# Patient Record
Sex: FEMALE | Race: WHITE | Employment: OTHER | URBAN - METROPOLITAN AREA
[De-identification: names, ages, dates, MRNs, and addresses within clinical notes are randomized per-mention and may not be internally consistent; named-entity substitution may affect disease eponyms.]

---

## 2018-07-17 ENCOUNTER — HOSPITAL ENCOUNTER (OUTPATIENT)
Dept: LAB | Age: 83
Discharge: HOME | End: 2018-07-17
Payer: MEDICARE

## 2018-07-17 PROCEDURE — 88175 CYTOPATH C/V AUTO FLUID REDO: CPT | Performed by: OBSTETRICS & GYNECOLOGY

## 2020-01-01 ENCOUNTER — APPOINTMENT (OUTPATIENT)
Dept: NON INVASIVE DIAGNOSTICS | Age: 85
DRG: 871 | End: 2020-01-01
Attending: INTERNAL MEDICINE
Payer: MEDICARE

## 2020-01-01 ENCOUNTER — HOSPITAL ENCOUNTER (INPATIENT)
Age: 85
LOS: 3 days | Discharge: HOSPICE | DRG: 871 | End: 2020-05-18
Attending: EMERGENCY MEDICINE | Admitting: INTERNAL MEDICINE
Payer: MEDICARE

## 2020-01-01 ENCOUNTER — APPOINTMENT (OUTPATIENT)
Dept: GENERAL RADIOLOGY | Age: 85
DRG: 871 | End: 2020-01-01
Attending: EMERGENCY MEDICINE
Payer: MEDICARE

## 2020-01-01 ENCOUNTER — APPOINTMENT (OUTPATIENT)
Dept: CT IMAGING | Age: 85
DRG: 871 | End: 2020-01-01
Attending: EMERGENCY MEDICINE
Payer: MEDICARE

## 2020-01-01 ENCOUNTER — APPOINTMENT (OUTPATIENT)
Dept: GENERAL RADIOLOGY | Age: 85
DRG: 871 | End: 2020-01-01
Attending: INTERNAL MEDICINE
Payer: MEDICARE

## 2020-01-01 ENCOUNTER — HOSPITAL ENCOUNTER (INPATIENT)
Age: 85
LOS: 2 days | DRG: 291 | End: 2020-05-20
Attending: FAMILY MEDICINE | Admitting: FAMILY MEDICINE
Payer: OTHER MISCELLANEOUS

## 2020-01-01 ENCOUNTER — HOSPICE ADMISSION (OUTPATIENT)
Dept: HOSPICE | Facility: HOSPICE | Age: 85
End: 2020-01-01
Payer: MEDICARE

## 2020-01-01 VITALS
HEIGHT: 63 IN | RESPIRATION RATE: 18 BRPM | HEART RATE: 86 BPM | BODY MASS INDEX: 17.73 KG/M2 | TEMPERATURE: 96.5 F | WEIGHT: 100.09 LBS | SYSTOLIC BLOOD PRESSURE: 146 MMHG | DIASTOLIC BLOOD PRESSURE: 69 MMHG | OXYGEN SATURATION: 93 %

## 2020-01-01 VITALS
TEMPERATURE: 99.8 F | DIASTOLIC BLOOD PRESSURE: 64 MMHG | HEART RATE: 124 BPM | OXYGEN SATURATION: 93 % | SYSTOLIC BLOOD PRESSURE: 124 MMHG | RESPIRATION RATE: 16 BRPM

## 2020-01-01 DIAGNOSIS — J90 PLEURAL EFFUSION ON RIGHT: ICD-10-CM

## 2020-01-01 DIAGNOSIS — I48.20 CHRONIC ATRIAL FIBRILLATION WITH RAPID VENTRICULAR RESPONSE (HCC): ICD-10-CM

## 2020-01-01 DIAGNOSIS — F01.50 VASCULAR DEMENTIA WITHOUT BEHAVIORAL DISTURBANCE (HCC): ICD-10-CM

## 2020-01-01 DIAGNOSIS — R65.20 SEVERE SEPSIS (HCC): Primary | ICD-10-CM

## 2020-01-01 DIAGNOSIS — R06.02 SHORTNESS OF BREATH: ICD-10-CM

## 2020-01-01 DIAGNOSIS — R53.81 DEBILITY: ICD-10-CM

## 2020-01-01 DIAGNOSIS — Z71.89 GOALS OF CARE, COUNSELING/DISCUSSION: ICD-10-CM

## 2020-01-01 DIAGNOSIS — Z51.5 HOSPICE CARE PATIENT: ICD-10-CM

## 2020-01-01 DIAGNOSIS — K56.41 FECAL IMPACTION (HCC): ICD-10-CM

## 2020-01-01 DIAGNOSIS — A41.9 SEVERE SEPSIS (HCC): Primary | ICD-10-CM

## 2020-01-01 DIAGNOSIS — I50.84 END STAGE HEART FAILURE (HCC): ICD-10-CM

## 2020-01-01 DIAGNOSIS — R41.0 CONFUSION: ICD-10-CM

## 2020-01-01 LAB
ALBUMIN SERPL-MCNC: 2.1 G/DL (ref 3.5–5)
ALBUMIN SERPL-MCNC: 3 G/DL (ref 3.5–5)
ALBUMIN/GLOB SERPL: 0.7 {RATIO} (ref 1.1–2.2)
ALBUMIN/GLOB SERPL: 0.8 {RATIO} (ref 1.1–2.2)
ALP SERPL-CCNC: 58 U/L (ref 45–117)
ALP SERPL-CCNC: 78 U/L (ref 45–117)
ALT SERPL-CCNC: 22 U/L (ref 12–78)
ALT SERPL-CCNC: 25 U/L (ref 12–78)
ANION GAP SERPL CALC-SCNC: 3 MMOL/L (ref 5–15)
ANION GAP SERPL CALC-SCNC: 4 MMOL/L (ref 5–15)
ANION GAP SERPL CALC-SCNC: 4 MMOL/L (ref 5–15)
ANION GAP SERPL CALC-SCNC: 6 MMOL/L (ref 5–15)
APPEARANCE UR: ABNORMAL
AST SERPL-CCNC: 20 U/L (ref 15–37)
AST SERPL-CCNC: 23 U/L (ref 15–37)
ATRIAL RATE: 150 BPM
AV VELOCITY RATIO: 0.86
B PERT DNA SPEC QL NAA+PROBE: NOT DETECTED
BACTERIA SPEC CULT: ABNORMAL
BACTERIA SPEC CULT: ABNORMAL
BACTERIA SPEC CULT: NORMAL
BACTERIA URNS QL MICRO: ABNORMAL /HPF
BASOPHILS # BLD: 0 K/UL (ref 0–0.1)
BASOPHILS NFR BLD: 0 % (ref 0–1)
BILIRUB SERPL-MCNC: 0.8 MG/DL (ref 0.2–1)
BILIRUB SERPL-MCNC: 1.2 MG/DL (ref 0.2–1)
BILIRUB UR QL: NEGATIVE
BNP SERPL-MCNC: 1438 PG/ML
BORDETELLA PARAPERTUSSIS PCR, BORPAR: NOT DETECTED
BUN SERPL-MCNC: 15 MG/DL (ref 6–20)
BUN SERPL-MCNC: 18 MG/DL (ref 6–20)
BUN SERPL-MCNC: 26 MG/DL (ref 6–20)
BUN SERPL-MCNC: 27 MG/DL (ref 6–20)
BUN/CREAT SERPL: 35 (ref 12–20)
BUN/CREAT SERPL: 35 (ref 12–20)
BUN/CREAT SERPL: 36 (ref 12–20)
BUN/CREAT SERPL: 38 (ref 12–20)
C PNEUM DNA SPEC QL NAA+PROBE: NOT DETECTED
CALCIUM SERPL-MCNC: 8 MG/DL (ref 8.5–10.1)
CALCIUM SERPL-MCNC: 8.5 MG/DL (ref 8.5–10.1)
CALCIUM SERPL-MCNC: 8.8 MG/DL (ref 8.5–10.1)
CALCIUM SERPL-MCNC: 9.4 MG/DL (ref 8.5–10.1)
CALCULATED R AXIS, ECG10: -124 DEGREES
CC UR VC: ABNORMAL
CHLORIDE SERPL-SCNC: 109 MMOL/L (ref 97–108)
CHLORIDE SERPL-SCNC: 109 MMOL/L (ref 97–108)
CHLORIDE SERPL-SCNC: 112 MMOL/L (ref 97–108)
CHLORIDE SERPL-SCNC: 115 MMOL/L (ref 97–108)
CK SERPL-CCNC: 25 U/L (ref 26–192)
CO2 SERPL-SCNC: 24 MMOL/L (ref 21–32)
CO2 SERPL-SCNC: 25 MMOL/L (ref 21–32)
CO2 SERPL-SCNC: 26 MMOL/L (ref 21–32)
CO2 SERPL-SCNC: 27 MMOL/L (ref 21–32)
COLOR UR: ABNORMAL
COMMENT, HOLDF: NORMAL
CREAT SERPL-MCNC: 0.42 MG/DL (ref 0.55–1.02)
CREAT SERPL-MCNC: 0.52 MG/DL (ref 0.55–1.02)
CREAT SERPL-MCNC: 0.68 MG/DL (ref 0.55–1.02)
CREAT SERPL-MCNC: 0.78 MG/DL (ref 0.55–1.02)
D DIMER PPP FEU-MCNC: 1.43 MG/L FEU (ref 0–0.65)
DATE LAST DOSE: ABNORMAL
DIAGNOSIS, 93000: NORMAL
DIFFERENTIAL METHOD BLD: ABNORMAL
ECHO AO ROOT DIAM: 3.64 CM
ECHO AV AREA PEAK VELOCITY: 2.6 CM2
ECHO AV AREA/BSA PEAK VELOCITY: 1.8 CM2/M2
ECHO AV PEAK GRADIENT: 5.4 MMHG
ECHO AV PEAK VELOCITY: 115.72 CM/S
ECHO EST RA PRESSURE: 8 MMHG
ECHO LA AREA 4C: 13.2 CM2
ECHO LA VOL 2C: 47.86 ML (ref 22–52)
ECHO LA VOL 4C: 30.92 ML (ref 22–52)
ECHO LA VOL BP: 39.9 ML (ref 22–52)
ECHO LA VOL/BSA BIPLANE: 27.69 ML/M2 (ref 16–28)
ECHO LA VOLUME INDEX A2C: 33.21 ML/M2 (ref 16–28)
ECHO LA VOLUME INDEX A4C: 21.46 ML/M2 (ref 16–28)
ECHO LV EDV TEICHHOLZ: 54.55 ML
ECHO LV ESV TEICHHOLZ: 17.11 ML
ECHO LV INTERNAL DIMENSION DIASTOLIC: 4.17 CM (ref 3.9–5.3)
ECHO LV INTERNAL DIMENSION SYSTOLIC: 2.59 CM
ECHO LV IVSD: 0.95 CM (ref 0.6–0.9)
ECHO LV MASS 2D: 168.6 G (ref 67–162)
ECHO LV MASS INDEX 2D: 117 G/M2 (ref 43–95)
ECHO LV POSTERIOR WALL DIASTOLIC: 1.16 CM (ref 0.6–0.9)
ECHO LVOT DIAM: 1.96 CM
ECHO LVOT PEAK GRADIENT: 4 MMHG
ECHO LVOT PEAK VELOCITY: 99.39 CM/S
ECHO PULMONARY ARTERY SYSTOLIC PRESSURE (PASP): 65.7 MMHG
ECHO PV MAX VELOCITY: 77.84 CM/S
ECHO PV PEAK GRADIENT: 2.4 MMHG
ECHO RIGHT VENTRICULAR SYSTOLIC PRESSURE (RVSP): 65.7 MMHG
ECHO RV INTERNAL DIMENSION: 4.43 CM
ECHO TV REGURGITANT MAX VELOCITY: 379.97 CM/S
ECHO TV REGURGITANT PEAK GRADIENT: 57.7 MMHG
EOSINOPHIL # BLD: 0 K/UL (ref 0–0.4)
EOSINOPHIL NFR BLD: 0 % (ref 0–7)
EPITH CASTS URNS QL MICRO: ABNORMAL /LPF
ERYTHROCYTE [DISTWIDTH] IN BLOOD BY AUTOMATED COUNT: 13.2 % (ref 11.5–14.5)
ERYTHROCYTE [DISTWIDTH] IN BLOOD BY AUTOMATED COUNT: 13.3 % (ref 11.5–14.5)
ERYTHROCYTE [DISTWIDTH] IN BLOOD BY AUTOMATED COUNT: 13.4 % (ref 11.5–14.5)
ERYTHROCYTE [DISTWIDTH] IN BLOOD BY AUTOMATED COUNT: 13.5 % (ref 11.5–14.5)
FERRITIN SERPL-MCNC: 306 NG/ML (ref 8–252)
FLUAV H1 2009 PAND RNA SPEC QL NAA+PROBE: NOT DETECTED
FLUAV H1 RNA SPEC QL NAA+PROBE: NOT DETECTED
FLUAV H3 RNA SPEC QL NAA+PROBE: NOT DETECTED
FLUAV SUBTYP SPEC NAA+PROBE: NOT DETECTED
FLUBV RNA SPEC QL NAA+PROBE: NOT DETECTED
FLUID CULTURE, SPNG2: NORMAL
GLOBULIN SER CALC-MCNC: 3.2 G/DL (ref 2–4)
GLOBULIN SER CALC-MCNC: 4 G/DL (ref 2–4)
GLUCOSE SERPL-MCNC: 142 MG/DL (ref 65–100)
GLUCOSE SERPL-MCNC: 144 MG/DL (ref 65–100)
GLUCOSE SERPL-MCNC: 180 MG/DL (ref 65–100)
GLUCOSE SERPL-MCNC: 200 MG/DL (ref 65–100)
GLUCOSE UR STRIP.AUTO-MCNC: NEGATIVE MG/DL
HADV DNA SPEC QL NAA+PROBE: NOT DETECTED
HCOV 229E RNA SPEC QL NAA+PROBE: NOT DETECTED
HCOV HKU1 RNA SPEC QL NAA+PROBE: NOT DETECTED
HCOV NL63 RNA SPEC QL NAA+PROBE: NOT DETECTED
HCOV OC43 RNA SPEC QL NAA+PROBE: NOT DETECTED
HCT VFR BLD AUTO: 36.9 % (ref 35–47)
HCT VFR BLD AUTO: 41.2 % (ref 35–47)
HCT VFR BLD AUTO: 45.8 % (ref 35–47)
HCT VFR BLD AUTO: 47.3 % (ref 35–47)
HGB BLD-MCNC: 11.9 G/DL (ref 11.5–16)
HGB BLD-MCNC: 13.2 G/DL (ref 11.5–16)
HGB BLD-MCNC: 14.4 G/DL (ref 11.5–16)
HGB BLD-MCNC: 14.8 G/DL (ref 11.5–16)
HGB UR QL STRIP: ABNORMAL
HMPV RNA SPEC QL NAA+PROBE: NOT DETECTED
HPIV1 RNA SPEC QL NAA+PROBE: NOT DETECTED
HPIV2 RNA SPEC QL NAA+PROBE: NOT DETECTED
HPIV3 RNA SPEC QL NAA+PROBE: NOT DETECTED
HPIV4 RNA SPEC QL NAA+PROBE: NOT DETECTED
IMM GRANULOCYTES # BLD AUTO: 0.1 K/UL (ref 0–0.04)
IMM GRANULOCYTES NFR BLD AUTO: 1 % (ref 0–0.5)
INR PPP: 1.2 (ref 0.9–1.1)
KETONES UR QL STRIP.AUTO: ABNORMAL MG/DL
L PNEUMO1 AG UR QL IA: NEGATIVE
LACTATE SERPL-SCNC: 1.9 MMOL/L (ref 0.4–2)
LDH SERPL L TO P-CCNC: 189 U/L (ref 81–246)
LEUKOCYTE ESTERASE UR QL STRIP.AUTO: ABNORMAL
LVFS 2D: 38.04 %
LVSV (TEICH): 37.44 ML
LYMPHOCYTES # BLD: 0.6 K/UL (ref 0.8–3.5)
LYMPHOCYTES NFR BLD: 4 % (ref 12–49)
M PNEUMO DNA SPEC QL NAA+PROBE: NOT DETECTED
MAGNESIUM SERPL-MCNC: 1.5 MG/DL (ref 1.6–2.4)
MAGNESIUM SERPL-MCNC: 1.8 MG/DL (ref 1.6–2.4)
MAGNESIUM SERPL-MCNC: 1.9 MG/DL (ref 1.6–2.4)
MAGNESIUM SERPL-MCNC: 2.4 MG/DL (ref 1.6–2.4)
MCH RBC QN AUTO: 31.2 PG (ref 26–34)
MCH RBC QN AUTO: 31.7 PG (ref 26–34)
MCH RBC QN AUTO: 31.9 PG (ref 26–34)
MCH RBC QN AUTO: 32.2 PG (ref 26–34)
MCHC RBC AUTO-ENTMCNC: 30.4 G/DL (ref 30–36.5)
MCHC RBC AUTO-ENTMCNC: 32 G/DL (ref 30–36.5)
MCHC RBC AUTO-ENTMCNC: 32.2 G/DL (ref 30–36.5)
MCHC RBC AUTO-ENTMCNC: 32.3 G/DL (ref 30–36.5)
MCV RBC AUTO: 102.4 FL (ref 80–99)
MCV RBC AUTO: 98.7 FL (ref 80–99)
MCV RBC AUTO: 99 FL (ref 80–99)
MCV RBC AUTO: 99.7 FL (ref 80–99)
MONOCYTES # BLD: 1.5 K/UL (ref 0–1)
MONOCYTES NFR BLD: 10 % (ref 5–13)
NEUTS SEG # BLD: 12.3 K/UL (ref 1.8–8)
NEUTS SEG NFR BLD: 85 % (ref 32–75)
NITRITE UR QL STRIP.AUTO: NEGATIVE
NRBC # BLD: 0 K/UL (ref 0–0.01)
NRBC BLD-RTO: 0 PER 100 WBC
ORGANISM ID, SPNG3: NORMAL
PH UR STRIP: 5.5 [PH] (ref 5–8)
PHOSPHATE SERPL-MCNC: 1.5 MG/DL (ref 2.6–4.7)
PHOSPHATE SERPL-MCNC: 1.7 MG/DL (ref 2.6–4.7)
PHOSPHATE SERPL-MCNC: 1.7 MG/DL (ref 2.6–4.7)
PHOSPHATE SERPL-MCNC: 1.9 MG/DL (ref 2.6–4.7)
PLATELET # BLD AUTO: 154 K/UL (ref 150–400)
PLATELET # BLD AUTO: 173 K/UL (ref 150–400)
PLATELET # BLD AUTO: 199 K/UL (ref 150–400)
PLATELET # BLD AUTO: 236 K/UL (ref 150–400)
PLEASE NOTE, SPNG4: NORMAL
PMV BLD AUTO: 10.2 FL (ref 8.9–12.9)
PMV BLD AUTO: 9.6 FL (ref 8.9–12.9)
PMV BLD AUTO: 9.9 FL (ref 8.9–12.9)
PMV BLD AUTO: 9.9 FL (ref 8.9–12.9)
POTASSIUM SERPL-SCNC: 4 MMOL/L (ref 3.5–5.1)
POTASSIUM SERPL-SCNC: 4.1 MMOL/L (ref 3.5–5.1)
POTASSIUM SERPL-SCNC: 4.8 MMOL/L (ref 3.5–5.1)
POTASSIUM SERPL-SCNC: 4.8 MMOL/L (ref 3.5–5.1)
PROCALCITONIN SERPL-MCNC: 0.14 NG/ML
PROT SERPL-MCNC: 5.3 G/DL (ref 6.4–8.2)
PROT SERPL-MCNC: 7 G/DL (ref 6.4–8.2)
PROT UR STRIP-MCNC: 100 MG/DL
PROTHROMBIN TIME: 12.3 SEC (ref 9–11.1)
Q-T INTERVAL, ECG07: 256 MS
QRS DURATION, ECG06: 80 MS
QTC CALCULATION (BEZET), ECG08: 393 MS
RBC # BLD AUTO: 3.7 M/UL (ref 3.8–5.2)
RBC # BLD AUTO: 4.16 M/UL (ref 3.8–5.2)
RBC # BLD AUTO: 4.62 M/UL (ref 3.8–5.2)
RBC # BLD AUTO: 4.64 M/UL (ref 3.8–5.2)
RBC #/AREA URNS HPF: >100 /HPF (ref 0–5)
RBC MORPH BLD: ABNORMAL
REPORTED DOSE,DOSE: ABNORMAL UNITS
REPORTED DOSE/TIME,TMG: 2200
RSV RNA SPEC QL NAA+PROBE: NOT DETECTED
RV+EV RNA SPEC QL NAA+PROBE: NOT DETECTED
S PNEUM AG SPEC QL LA: NEGATIVE
SAMPLES BEING HELD,HOLD: NORMAL
SARS-COV-2, COV2: NOT DETECTED
SERVICE CMNT-IMP: ABNORMAL
SERVICE CMNT-IMP: NORMAL
SERVICE CMNT-IMP: NORMAL
SODIUM SERPL-SCNC: 138 MMOL/L (ref 136–145)
SODIUM SERPL-SCNC: 140 MMOL/L (ref 136–145)
SODIUM SERPL-SCNC: 141 MMOL/L (ref 136–145)
SODIUM SERPL-SCNC: 145 MMOL/L (ref 136–145)
SOURCE, COVRS: NORMAL
SP GR UR REFRACTOMETRY: 1.02 (ref 1–1.03)
SPECIMEN SOURCE, FCOV2M: NORMAL
SPECIMEN SOURCE: NORMAL
SPECIMEN SOURCE: NORMAL
SPECIMEN, SPNG1: NORMAL
TROPONIN I SERPL-MCNC: <0.05 NG/ML
TSH SERPL DL<=0.05 MIU/L-ACNC: 0.98 UIU/ML (ref 0.36–3.74)
UA: UC IF INDICATED,UAUC: ABNORMAL
UROBILINOGEN UR QL STRIP.AUTO: 0.2 EU/DL (ref 0.2–1)
VANCOMYCIN TROUGH SERPL-MCNC: 3.1 UG/ML (ref 5–10)
VENTRICULAR RATE, ECG03: 142 BPM
WBC # BLD AUTO: 12 K/UL (ref 3.6–11)
WBC # BLD AUTO: 12.9 K/UL (ref 3.6–11)
WBC # BLD AUTO: 14.5 K/UL (ref 3.6–11)
WBC # BLD AUTO: 16.5 K/UL (ref 3.6–11)
WBC URNS QL MICRO: ABNORMAL /HPF (ref 0–4)

## 2020-01-01 PROCEDURE — 74011000250 HC RX REV CODE- 250: Performed by: NURSE PRACTITIONER

## 2020-01-01 PROCEDURE — 36415 COLL VENOUS BLD VENIPUNCTURE: CPT

## 2020-01-01 PROCEDURE — 74011000250 HC RX REV CODE- 250: Performed by: FAMILY MEDICINE

## 2020-01-01 PROCEDURE — 99285 EMERGENCY DEPT VISIT HI MDM: CPT

## 2020-01-01 PROCEDURE — 77030038269 HC DRN EXT URIN PURWCK BARD -A

## 2020-01-01 PROCEDURE — 71045 X-RAY EXAM CHEST 1 VIEW: CPT

## 2020-01-01 PROCEDURE — 87040 BLOOD CULTURE FOR BACTERIA: CPT

## 2020-01-01 PROCEDURE — 71250 CT THORAX DX C-: CPT

## 2020-01-01 PROCEDURE — 93005 ELECTROCARDIOGRAM TRACING: CPT

## 2020-01-01 PROCEDURE — 85027 COMPLETE CBC AUTOMATED: CPT

## 2020-01-01 PROCEDURE — 74011250636 HC RX REV CODE- 250/636: Performed by: NURSE PRACTITIONER

## 2020-01-01 PROCEDURE — 87449 NOS EACH ORGANISM AG IA: CPT

## 2020-01-01 PROCEDURE — 77030041247 HC PROTECTOR HEEL HEELMEDIX MDII -B

## 2020-01-01 PROCEDURE — 74011250636 HC RX REV CODE- 250/636: Performed by: FAMILY MEDICINE

## 2020-01-01 PROCEDURE — 84100 ASSAY OF PHOSPHORUS: CPT

## 2020-01-01 PROCEDURE — 85379 FIBRIN DEGRADATION QUANT: CPT

## 2020-01-01 PROCEDURE — 74011250636 HC RX REV CODE- 250/636: Performed by: INTERNAL MEDICINE

## 2020-01-01 PROCEDURE — 74011000250 HC RX REV CODE- 250: Performed by: INTERNAL MEDICINE

## 2020-01-01 PROCEDURE — 74011250637 HC RX REV CODE- 250/637: Performed by: INTERNAL MEDICINE

## 2020-01-01 PROCEDURE — 83735 ASSAY OF MAGNESIUM: CPT

## 2020-01-01 PROCEDURE — 77010033678 HC OXYGEN DAILY

## 2020-01-01 PROCEDURE — 65660000000 HC RM CCU STEPDOWN

## 2020-01-01 PROCEDURE — 65270000029 HC RM PRIVATE

## 2020-01-01 PROCEDURE — 96374 THER/PROPH/DIAG INJ IV PUSH: CPT

## 2020-01-01 PROCEDURE — 80048 BASIC METABOLIC PNL TOTAL CA: CPT

## 2020-01-01 PROCEDURE — 87899 AGENT NOS ASSAY W/OPTIC: CPT

## 2020-01-01 PROCEDURE — 99233 SBSQ HOSP IP/OBS HIGH 50: CPT | Performed by: FAMILY MEDICINE

## 2020-01-01 PROCEDURE — 74011000258 HC RX REV CODE- 258: Performed by: INTERNAL MEDICINE

## 2020-01-01 PROCEDURE — 93306 TTE W/DOPPLER COMPLETE: CPT

## 2020-01-01 PROCEDURE — 84443 ASSAY THYROID STIM HORMONE: CPT

## 2020-01-01 PROCEDURE — 0100U RESPIRATORY PANEL,PCR,NASOPHARYNGEAL: CPT

## 2020-01-01 PROCEDURE — 83880 ASSAY OF NATRIURETIC PEPTIDE: CPT

## 2020-01-01 PROCEDURE — 82550 ASSAY OF CK (CPK): CPT

## 2020-01-01 PROCEDURE — 74011250636 HC RX REV CODE- 250/636: Performed by: EMERGENCY MEDICINE

## 2020-01-01 PROCEDURE — 80053 COMPREHEN METABOLIC PANEL: CPT

## 2020-01-01 PROCEDURE — 87147 CULTURE TYPE IMMUNOLOGIC: CPT

## 2020-01-01 PROCEDURE — 83615 LACTATE (LD) (LDH) ENZYME: CPT

## 2020-01-01 PROCEDURE — 87635 SARS-COV-2 COVID-19 AMP PRB: CPT

## 2020-01-01 PROCEDURE — 74011000250 HC RX REV CODE- 250: Performed by: EMERGENCY MEDICINE

## 2020-01-01 PROCEDURE — 87077 CULTURE AEROBIC IDENTIFY: CPT

## 2020-01-01 PROCEDURE — 85610 PROTHROMBIN TIME: CPT

## 2020-01-01 PROCEDURE — 83605 ASSAY OF LACTIC ACID: CPT

## 2020-01-01 PROCEDURE — 87086 URINE CULTURE/COLONY COUNT: CPT

## 2020-01-01 PROCEDURE — 84484 ASSAY OF TROPONIN QUANT: CPT

## 2020-01-01 PROCEDURE — 3336500001 HSPC ELECTION

## 2020-01-01 PROCEDURE — 81001 URINALYSIS AUTO W/SCOPE: CPT

## 2020-01-01 PROCEDURE — 74011250637 HC RX REV CODE- 250/637: Performed by: EMERGENCY MEDICINE

## 2020-01-01 PROCEDURE — 94760 N-INVAS EAR/PLS OXIMETRY 1: CPT

## 2020-01-01 PROCEDURE — 82728 ASSAY OF FERRITIN: CPT

## 2020-01-01 PROCEDURE — 74176 CT ABD & PELVIS W/O CONTRAST: CPT

## 2020-01-01 PROCEDURE — 84145 PROCALCITONIN (PCT): CPT

## 2020-01-01 PROCEDURE — 85025 COMPLETE CBC W/AUTO DIFF WBC: CPT

## 2020-01-01 PROCEDURE — 74011250637 HC RX REV CODE- 250/637: Performed by: FAMILY MEDICINE

## 2020-01-01 PROCEDURE — 0656 HSPC GENERAL INPATIENT

## 2020-01-01 PROCEDURE — 96375 TX/PRO/DX INJ NEW DRUG ADDON: CPT

## 2020-01-01 PROCEDURE — 51701 INSERT BLADDER CATHETER: CPT

## 2020-01-01 PROCEDURE — 80202 ASSAY OF VANCOMYCIN: CPT

## 2020-01-01 RX ORDER — MORPHINE SULFATE 2 MG/ML
2 INJECTION, SOLUTION INTRAMUSCULAR; INTRAVENOUS
Status: DISCONTINUED | OUTPATIENT
Start: 2020-01-01 | End: 2020-01-01 | Stop reason: HOSPADM

## 2020-01-01 RX ORDER — AMOXICILLIN 250 MG
1 CAPSULE ORAL DAILY
Status: DISCONTINUED | OUTPATIENT
Start: 2020-01-01 | End: 2020-01-01

## 2020-01-01 RX ORDER — ONDANSETRON 2 MG/ML
4 INJECTION INTRAMUSCULAR; INTRAVENOUS
Status: DISCONTINUED | OUTPATIENT
Start: 2020-01-01 | End: 2020-01-01 | Stop reason: HOSPADM

## 2020-01-01 RX ORDER — ACETAMINOPHEN 650 MG/1
650 SUPPOSITORY RECTAL
Status: DISCONTINUED | OUTPATIENT
Start: 2020-01-01 | End: 2020-01-01 | Stop reason: HOSPADM

## 2020-01-01 RX ORDER — METOPROLOL TARTRATE 25 MG/1
12.5 TABLET, FILM COATED ORAL DAILY
Status: DISCONTINUED | OUTPATIENT
Start: 2020-01-01 | End: 2020-01-01

## 2020-01-01 RX ORDER — ACETAMINOPHEN 500 MG
1000 TABLET ORAL
Status: COMPLETED | OUTPATIENT
Start: 2020-01-01 | End: 2020-01-01

## 2020-01-01 RX ORDER — SCOLOPAMINE TRANSDERMAL SYSTEM 1 MG/1
1 PATCH, EXTENDED RELEASE TRANSDERMAL
Status: DISCONTINUED | OUTPATIENT
Start: 2020-01-01 | End: 2020-01-01 | Stop reason: HOSPADM

## 2020-01-01 RX ORDER — MAGNESIUM SULFATE HEPTAHYDRATE 40 MG/ML
2 INJECTION, SOLUTION INTRAVENOUS
Status: COMPLETED | OUTPATIENT
Start: 2020-01-01 | End: 2020-01-01

## 2020-01-01 RX ORDER — DEXTROSE, SODIUM CHLORIDE, AND POTASSIUM CHLORIDE 5; .45; .15 G/100ML; G/100ML; G/100ML
150 INJECTION INTRAVENOUS CONTINUOUS
Status: DISCONTINUED | OUTPATIENT
Start: 2020-01-01 | End: 2020-01-01

## 2020-01-01 RX ORDER — MORPHINE SULFATE 2 MG/ML
1 INJECTION, SOLUTION INTRAMUSCULAR; INTRAVENOUS
Status: DISCONTINUED | OUTPATIENT
Start: 2020-01-01 | End: 2020-01-01

## 2020-01-01 RX ORDER — ACETAMINOPHEN 325 MG/1
650 TABLET ORAL
Status: DISCONTINUED | OUTPATIENT
Start: 2020-01-01 | End: 2020-01-01 | Stop reason: HOSPADM

## 2020-01-01 RX ORDER — MAGNESIUM SULFATE 1 G/100ML
1 INJECTION INTRAVENOUS ONCE
Status: COMPLETED | OUTPATIENT
Start: 2020-01-01 | End: 2020-01-01

## 2020-01-01 RX ORDER — GLYCERIN ADULT
1 SUPPOSITORY, RECTAL RECTAL
Status: COMPLETED | OUTPATIENT
Start: 2020-01-01 | End: 2020-01-01

## 2020-01-01 RX ORDER — VANCOMYCIN/0.9 % SOD CHLORIDE 1.5G/250ML
1500 PLASTIC BAG, INJECTION (ML) INTRAVENOUS EVERY 24 HOURS
Status: DISCONTINUED | OUTPATIENT
Start: 2020-01-01 | End: 2020-01-01 | Stop reason: DRUGHIGH

## 2020-01-01 RX ORDER — KETOROLAC TROMETHAMINE 30 MG/ML
30 INJECTION, SOLUTION INTRAMUSCULAR; INTRAVENOUS
Status: DISCONTINUED | OUTPATIENT
Start: 2020-01-01 | End: 2020-01-01 | Stop reason: HOSPADM

## 2020-01-01 RX ORDER — FACIAL-BODY WIPES
10 EACH TOPICAL DAILY PRN
Status: DISCONTINUED | OUTPATIENT
Start: 2020-01-01 | End: 2020-01-01 | Stop reason: HOSPADM

## 2020-01-01 RX ORDER — PROCHLORPERAZINE EDISYLATE 5 MG/ML
10 INJECTION INTRAMUSCULAR; INTRAVENOUS
Status: DISCONTINUED | OUTPATIENT
Start: 2020-01-01 | End: 2020-01-01 | Stop reason: HOSPADM

## 2020-01-01 RX ORDER — GLYCOPYRROLATE 0.2 MG/ML
0.2 INJECTION INTRAMUSCULAR; INTRAVENOUS
Status: DISCONTINUED | OUTPATIENT
Start: 2020-01-01 | End: 2020-01-01 | Stop reason: HOSPADM

## 2020-01-01 RX ORDER — HALOPERIDOL 5 MG/ML
2 INJECTION INTRAMUSCULAR
Status: DISCONTINUED | OUTPATIENT
Start: 2020-01-01 | End: 2020-01-01 | Stop reason: HOSPADM

## 2020-01-01 RX ORDER — LABETALOL HCL 20 MG/4 ML
20 SYRINGE (ML) INTRAVENOUS
Status: DISCONTINUED | OUTPATIENT
Start: 2020-01-01 | End: 2020-01-01

## 2020-01-01 RX ORDER — LORAZEPAM 2 MG/ML
1 INJECTION INTRAMUSCULAR EVERY 4 HOURS
Status: DISCONTINUED | OUTPATIENT
Start: 2020-01-01 | End: 2020-01-01 | Stop reason: HOSPADM

## 2020-01-01 RX ORDER — MORPHINE SULFATE 2 MG/ML
2 INJECTION, SOLUTION INTRAMUSCULAR; INTRAVENOUS EVERY 4 HOURS
Status: DISCONTINUED | OUTPATIENT
Start: 2020-01-01 | End: 2020-01-01

## 2020-01-01 RX ORDER — DIPHENHYDRAMINE HCL 25 MG
25 CAPSULE ORAL
Status: DISCONTINUED | OUTPATIENT
Start: 2020-01-01 | End: 2020-01-01 | Stop reason: HOSPADM

## 2020-01-01 RX ORDER — LORAZEPAM 2 MG/ML
1 INJECTION INTRAMUSCULAR
Status: DISCONTINUED | OUTPATIENT
Start: 2020-01-01 | End: 2020-01-01 | Stop reason: HOSPADM

## 2020-01-01 RX ORDER — DILTIAZEM HYDROCHLORIDE 5 MG/ML
20 INJECTION INTRAVENOUS
Status: COMPLETED | OUTPATIENT
Start: 2020-01-01 | End: 2020-01-01

## 2020-01-01 RX ORDER — METOPROLOL TARTRATE 25 MG/1
25 TABLET, FILM COATED ORAL
COMMUNITY

## 2020-01-01 RX ORDER — METRONIDAZOLE 500 MG/100ML
500 INJECTION, SOLUTION INTRAVENOUS EVERY 12 HOURS
Status: DISCONTINUED | OUTPATIENT
Start: 2020-01-01 | End: 2020-01-01

## 2020-01-01 RX ORDER — METOPROLOL TARTRATE 25 MG/1
25 TABLET, FILM COATED ORAL
Status: DISCONTINUED | OUTPATIENT
Start: 2020-01-01 | End: 2020-01-01

## 2020-01-01 RX ORDER — HALOPERIDOL 2 MG/ML
2 SOLUTION ORAL
Status: DISCONTINUED | OUTPATIENT
Start: 2020-01-01 | End: 2020-01-01 | Stop reason: HOSPADM

## 2020-01-01 RX ORDER — POLYETHYLENE GLYCOL 3350 17 G/17G
17 POWDER, FOR SOLUTION ORAL
Status: DISCONTINUED | OUTPATIENT
Start: 2020-01-01 | End: 2020-01-01

## 2020-01-01 RX ADMIN — METOPROLOL TARTRATE 12.5 MG: 25 TABLET, FILM COATED ORAL at 10:11

## 2020-01-01 RX ADMIN — DEXTROSE MONOHYDRATE, SODIUM CHLORIDE, AND POTASSIUM CHLORIDE 150 ML/HR: 50; 4.5; 1.49 INJECTION, SOLUTION INTRAVENOUS at 23:38

## 2020-01-01 RX ADMIN — MAGNESIUM SULFATE IN WATER 2 G: 40 INJECTION, SOLUTION INTRAVENOUS at 11:24

## 2020-01-01 RX ADMIN — GLYCOPYRROLATE 0.2 MG: 0.2 INJECTION, SOLUTION INTRAMUSCULAR; INTRAVENOUS at 08:09

## 2020-01-01 RX ADMIN — MORPHINE SULFATE 1 MG: 2 INJECTION, SOLUTION INTRAMUSCULAR; INTRAVENOUS at 12:17

## 2020-01-01 RX ADMIN — DEXTROSE MONOHYDRATE, SODIUM CHLORIDE, AND POTASSIUM CHLORIDE 150 ML/HR: 50; 4.5; 1.49 INJECTION, SOLUTION INTRAVENOUS at 12:02

## 2020-01-01 RX ADMIN — DEXTROSE MONOHYDRATE, SODIUM CHLORIDE, AND POTASSIUM CHLORIDE 150 ML/HR: 50; 4.5; 1.49 INJECTION, SOLUTION INTRAVENOUS at 12:55

## 2020-01-01 RX ADMIN — MORPHINE SULFATE 2 MG: 2 INJECTION, SOLUTION INTRAMUSCULAR; INTRAVENOUS at 04:19

## 2020-01-01 RX ADMIN — DIBASIC SODIUM PHOSPHATE, MONOBASIC POTASSIUM PHOSPHATE AND MONOBASIC SODIUM PHOSPHATE 2 TABLET: 852; 155; 130 TABLET ORAL at 11:24

## 2020-01-01 RX ADMIN — MORPHINE SULFATE 2 MG: 2 INJECTION, SOLUTION INTRAMUSCULAR; INTRAVENOUS at 08:09

## 2020-01-01 RX ADMIN — GLYCOPYRROLATE 0.2 MG: 0.2 INJECTION, SOLUTION INTRAMUSCULAR; INTRAVENOUS at 04:24

## 2020-01-01 RX ADMIN — VANCOMYCIN HYDROCHLORIDE 750 MG: 750 INJECTION, POWDER, LYOPHILIZED, FOR SOLUTION INTRAVENOUS at 21:44

## 2020-01-01 RX ADMIN — MAGNESIUM SULFATE HEPTAHYDRATE 1 G: 1 INJECTION, SOLUTION INTRAVENOUS at 09:59

## 2020-01-01 RX ADMIN — CEFEPIME 2 G: 2 INJECTION, POWDER, FOR SOLUTION INTRAVENOUS at 20:00

## 2020-01-01 RX ADMIN — DILTIAZEM HYDROCHLORIDE 20 MG: 5 INJECTION INTRAVENOUS at 20:38

## 2020-01-01 RX ADMIN — ACETAMINOPHEN 1000 MG: 500 TABLET ORAL at 20:52

## 2020-01-01 RX ADMIN — LABETALOL HYDROCHLORIDE 20 MG: 5 INJECTION, SOLUTION INTRAVENOUS at 18:35

## 2020-01-01 RX ADMIN — LORAZEPAM 1 MG: 2 INJECTION INTRAMUSCULAR; INTRAVENOUS at 20:00

## 2020-01-01 RX ADMIN — GLYCOPYRROLATE 0.2 MG: 0.2 INJECTION, SOLUTION INTRAMUSCULAR; INTRAVENOUS at 13:08

## 2020-01-01 RX ADMIN — CEFEPIME HYDROCHLORIDE 2 G: 2 INJECTION, POWDER, FOR SOLUTION INTRAVENOUS at 20:45

## 2020-01-01 RX ADMIN — SODIUM CHLORIDE 2000 ML: 900 INJECTION, SOLUTION INTRAVENOUS at 21:13

## 2020-01-01 RX ADMIN — CEFEPIME 2 G: 2 INJECTION, POWDER, FOR SOLUTION INTRAVENOUS at 08:29

## 2020-01-01 RX ADMIN — VANCOMYCIN HYDROCHLORIDE 750 MG: 750 INJECTION, POWDER, LYOPHILIZED, FOR SOLUTION INTRAVENOUS at 11:05

## 2020-01-01 RX ADMIN — VANCOMYCIN HYDROCHLORIDE 750 MG: 750 INJECTION, POWDER, LYOPHILIZED, FOR SOLUTION INTRAVENOUS at 22:08

## 2020-01-01 RX ADMIN — GLYCERIN 1 SUPPOSITORY: 2 SUPPOSITORY RECTAL at 22:33

## 2020-01-01 RX ADMIN — SENNOSIDES AND DOCUSATE SODIUM 1 TABLET: 8.6; 5 TABLET ORAL at 10:12

## 2020-01-01 RX ADMIN — MORPHINE SULFATE 2 MG: 2 INJECTION, SOLUTION INTRAMUSCULAR; INTRAVENOUS at 02:00

## 2020-01-01 RX ADMIN — MAGNESIUM SULFATE IN WATER 2 G: 40 INJECTION, SOLUTION INTRAVENOUS at 10:12

## 2020-01-01 RX ADMIN — MORPHINE SULFATE 2 MG: 2 INJECTION, SOLUTION INTRAMUSCULAR; INTRAVENOUS at 04:00

## 2020-01-01 RX ADMIN — SENNOSIDES AND DOCUSATE SODIUM 1 TABLET: 8.6; 5 TABLET ORAL at 08:35

## 2020-01-01 RX ADMIN — MORPHINE SULFATE 2 MG: 2 INJECTION, SOLUTION INTRAMUSCULAR; INTRAVENOUS at 11:30

## 2020-01-01 RX ADMIN — SODIUM PHOSPHATE, MONOBASIC, MONOHYDRATE: 276; 142 INJECTION, SOLUTION INTRAVENOUS at 12:05

## 2020-01-01 RX ADMIN — CEFEPIME 2 G: 2 INJECTION, POWDER, FOR SOLUTION INTRAVENOUS at 08:38

## 2020-01-01 RX ADMIN — MORPHINE SULFATE 2 MG: 2 INJECTION, SOLUTION INTRAMUSCULAR; INTRAVENOUS at 15:09

## 2020-01-01 RX ADMIN — MORPHINE SULFATE 2 MG: 2 INJECTION, SOLUTION INTRAMUSCULAR; INTRAVENOUS at 00:03

## 2020-01-01 RX ADMIN — GLYCOPYRROLATE 0.2 MG: 0.2 INJECTION, SOLUTION INTRAMUSCULAR; INTRAVENOUS at 04:00

## 2020-01-01 RX ADMIN — SODIUM PHOSPHATE, MONOBASIC, MONOHYDRATE: 276; 142 INJECTION, SOLUTION INTRAVENOUS at 09:37

## 2020-01-01 RX ADMIN — MORPHINE SULFATE 2 MG: 2 INJECTION, SOLUTION INTRAMUSCULAR; INTRAVENOUS at 20:00

## 2020-01-01 RX ADMIN — LORAZEPAM 1 MG: 2 INJECTION INTRAMUSCULAR; INTRAVENOUS at 08:08

## 2020-01-01 RX ADMIN — DEXTROSE MONOHYDRATE, SODIUM CHLORIDE, AND POTASSIUM CHLORIDE 150 ML/HR: 50; 4.5; 1.49 INJECTION, SOLUTION INTRAVENOUS at 03:49

## 2020-01-01 RX ADMIN — LORAZEPAM 1 MG: 2 INJECTION INTRAMUSCULAR; INTRAVENOUS at 16:02

## 2020-01-01 RX ADMIN — CEFEPIME 2 G: 2 INJECTION, POWDER, FOR SOLUTION INTRAVENOUS at 08:21

## 2020-01-01 RX ADMIN — METOPROLOL TARTRATE 25 MG: 25 TABLET, FILM COATED ORAL at 23:38

## 2020-01-01 RX ADMIN — VANCOMYCIN HYDROCHLORIDE 1250 MG: 1.25 INJECTION, POWDER, LYOPHILIZED, FOR SOLUTION INTRAVENOUS at 20:59

## 2020-01-01 RX ADMIN — MORPHINE SULFATE 2 MG: 2 INJECTION, SOLUTION INTRAMUSCULAR; INTRAVENOUS at 07:57

## 2020-01-01 RX ADMIN — LORAZEPAM 1 MG: 2 INJECTION INTRAMUSCULAR; INTRAVENOUS at 04:25

## 2020-01-01 RX ADMIN — MORPHINE SULFATE 2 MG: 2 INJECTION, SOLUTION INTRAMUSCULAR; INTRAVENOUS at 18:04

## 2020-01-01 RX ADMIN — METRONIDAZOLE 500 MG: 500 INJECTION, SOLUTION INTRAVENOUS at 18:11

## 2020-01-01 RX ADMIN — DIBASIC SODIUM PHOSPHATE, MONOBASIC POTASSIUM PHOSPHATE AND MONOBASIC SODIUM PHOSPHATE 2 TABLET: 852; 155; 130 TABLET ORAL at 10:11

## 2020-01-01 RX ADMIN — MORPHINE SULFATE 2 MG: 2 INJECTION, SOLUTION INTRAMUSCULAR; INTRAVENOUS at 13:07

## 2020-01-01 RX ADMIN — MORPHINE SULFATE 2 MG: 2 INJECTION, SOLUTION INTRAMUSCULAR; INTRAVENOUS at 21:59

## 2020-01-01 RX ADMIN — DEXTROSE MONOHYDRATE, SODIUM CHLORIDE, AND POTASSIUM CHLORIDE 150 ML/HR: 50; 4.5; 1.49 INJECTION, SOLUTION INTRAVENOUS at 18:11

## 2020-01-01 RX ADMIN — LORAZEPAM 1 MG: 2 INJECTION INTRAMUSCULAR; INTRAVENOUS at 00:03

## 2020-01-01 RX ADMIN — MORPHINE SULFATE 2 MG: 2 INJECTION, SOLUTION INTRAMUSCULAR; INTRAVENOUS at 05:58

## 2020-01-01 RX ADMIN — METRONIDAZOLE 500 MG: 500 INJECTION, SOLUTION INTRAVENOUS at 18:26

## 2020-01-01 RX ADMIN — METRONIDAZOLE 500 MG: 500 INJECTION, SOLUTION INTRAVENOUS at 06:24

## 2020-01-01 RX ADMIN — MORPHINE SULFATE 2 MG: 2 INJECTION, SOLUTION INTRAMUSCULAR; INTRAVENOUS at 16:27

## 2020-01-01 RX ADMIN — MORPHINE SULFATE 2 MG: 2 INJECTION, SOLUTION INTRAMUSCULAR; INTRAVENOUS at 00:01

## 2020-01-01 RX ADMIN — DEXTROSE MONOHYDRATE, SODIUM CHLORIDE, AND POTASSIUM CHLORIDE 150 ML/HR: 50; 4.5; 1.49 INJECTION, SOLUTION INTRAVENOUS at 20:06

## 2020-01-01 RX ADMIN — LORAZEPAM 1 MG: 2 INJECTION INTRAMUSCULAR; INTRAVENOUS at 16:27

## 2020-01-01 RX ADMIN — METOPROLOL TARTRATE 25 MG: 25 TABLET, FILM COATED ORAL at 22:08

## 2020-01-01 RX ADMIN — CEFEPIME 2 G: 2 INJECTION, POWDER, FOR SOLUTION INTRAVENOUS at 20:06

## 2020-01-01 RX ADMIN — MORPHINE SULFATE 2 MG: 2 INJECTION, SOLUTION INTRAMUSCULAR; INTRAVENOUS at 20:21

## 2020-01-01 RX ADMIN — METOPROLOL TARTRATE 12.5 MG: 25 TABLET, FILM COATED ORAL at 08:35

## 2020-01-01 RX ADMIN — LABETALOL HYDROCHLORIDE 20 MG: 5 INJECTION, SOLUTION INTRAVENOUS at 11:53

## 2020-01-01 RX ADMIN — LORAZEPAM 1 MG: 2 INJECTION INTRAMUSCULAR; INTRAVENOUS at 11:31

## 2020-01-01 RX ADMIN — METRONIDAZOLE 500 MG: 500 INJECTION, SOLUTION INTRAVENOUS at 05:57

## 2020-01-01 RX ADMIN — LORAZEPAM 1 MG: 2 INJECTION INTRAMUSCULAR; INTRAVENOUS at 09:21

## 2020-01-01 RX ADMIN — KETOROLAC TROMETHAMINE 30 MG: 30 INJECTION, SOLUTION INTRAMUSCULAR at 11:30

## 2020-05-15 PROBLEM — R62.7 FTT (FAILURE TO THRIVE) IN ADULT: Status: ACTIVE | Noted: 2020-01-01

## 2020-05-15 PROBLEM — R63.4 WEIGHT LOSS: Status: ACTIVE | Noted: 2020-01-01

## 2020-05-15 PROBLEM — D72.829 LEUKOCYTOSIS: Status: ACTIVE | Noted: 2020-01-01

## 2020-05-15 PROBLEM — R63.0 ANOREXIA: Status: ACTIVE | Noted: 2020-01-01

## 2020-05-15 PROBLEM — R53.83 LETHARGY: Status: ACTIVE | Noted: 2020-01-01

## 2020-05-15 PROBLEM — M54.9 BACK PAIN: Status: ACTIVE | Noted: 2020-01-01

## 2020-05-15 PROBLEM — N39.0 UTI (URINARY TRACT INFECTION): Status: ACTIVE | Noted: 2020-01-01

## 2020-05-15 PROBLEM — R06.82 TACHYPNEA: Status: ACTIVE | Noted: 2020-01-01

## 2020-05-15 PROBLEM — J90 PLEURAL EFFUSION ON RIGHT: Status: ACTIVE | Noted: 2020-01-01

## 2020-05-15 PROBLEM — A41.9 SEPSIS (HCC): Status: ACTIVE | Noted: 2020-01-01

## 2020-05-15 PROBLEM — K56.41 FECAL IMPACTION (HCC): Status: ACTIVE | Noted: 2020-01-01

## 2020-05-15 PROBLEM — R06.00 DYSPNEA: Status: ACTIVE | Noted: 2020-01-01

## 2020-05-15 PROBLEM — E86.0 DEHYDRATION: Status: ACTIVE | Noted: 2020-01-01

## 2020-05-15 PROBLEM — R00.0 TACHYCARDIA: Status: ACTIVE | Noted: 2020-01-01

## 2020-05-15 PROBLEM — Z79.01 CHRONIC ANTICOAGULATION: Status: ACTIVE | Noted: 2020-01-01

## 2020-05-15 PROBLEM — R50.9 FEVER: Status: ACTIVE | Noted: 2020-01-01

## 2020-05-15 NOTE — ED TRIAGE NOTES
Patient presents to ED for SOB. Per pt's daughter, she noticed some labored breathing on Tuesday especially while eating. Daughter has also noticed increasing SOB while ambulating. Seen at [facility] earlier today and tested for [address]. Per daughter, pt is a DNR.

## 2020-05-16 PROBLEM — R62.7 FTT (FAILURE TO THRIVE) IN ADULT: Chronic | Status: ACTIVE | Noted: 2020-01-01

## 2020-05-16 NOTE — CONSULTS
Palliative Medicine Consult [place]: [phone] ([phone]) Patient Name: Amari Mukherjee YOB: 1925 Date of Initial Consult: 5/16/20 Reason for Consult: Care decisions Requesting Provider: Eitan Chong Primary Care Physician: Severo Levering, MD 
 
 SUMMARY:  
Amari Mukherjee is a 80 y. o. with a past history of CVA about 5 years ago, a fib, dementia who was admitted on 5/15/2020 from home (splits her time w/ her dtrs and son's homes) with anorexia and lethargy. UTI, started on IV abx. Pulmonary consulted for possible thoracentesis for R pleural effusion. Current medical issues leading to Palliative Medicine involvement include: care decisions. Social: Pt had CVA 5 years ago, before very active. Mult children, mPOA is Margarita. She splits her time between 3 of her children's homes and given pandemic living w/ Sujit Weiss the most right now. PALLIATIVE DIAGNOSES:  
1. Dementia, vascular, CVA 2. Confusion, intermittently awake/alert and talks 3. Debility 4. UTI 5. Goals of care PLAN:  
1. Meet w/ pt who makes good eye contact, NAD, and tells me she has no pain. Has not wanted to eat/drink much. Cannot answer other questions for me. 2. Speak to [facility] and then later dtr Sujit Weiss. Pt has done very well over past 5 years, they are doing a great job caring for her- but this could be the start of a decline. Back when pt was 88, in her AMD there is a handwritten note about the type of care she wants near end of life and it supports hospice. 3. They don't like having pt in the hospital, they favor a more conservative approach and once again- this is all in line w/ hospice. 4. Explain hospice services- at first the medical team was under impression that they did not want outside help and that is not completely accurate.  They do not need aides in the home, but would welcome an RN doing intermittent visits or being available to come out if pt has SOB, confusion w/ possible UTI, etc.  
 5. Talk about possible thoracentesis and explain procedure a bit, they would be open to it if pulmonary felt would help her breathing. Right now breathing okay. 6. Give education about signs/sx of UTI. 7. Has AMD and DDNR on file. 8. Hospice RN Akil Ask to call dtr Hortonthao Barker, son Jocelyn Christian defers to her right now although he would be the one to sign consents- will be living w/ Hortonthao Barker for the foreseeable future given pandemic. 9. Initial consult note routed to primary continuity provider and/or primary health care team members 10. Communicated plan of care with: Palliative IDT, [facility] [address] Team incl bedside RN 
 
 GOALS OF CARE / TREATMENT PREFERENCES:  
 
GOALS OF CARE: 
Patient/Health Care Proxy Stated Goals: Other (comment)(To get as good as she can get to go home w/ hospice ) TREATMENT PREFERENCES:  
Code Status: DNR Advance Care Planning: 
[x] [facility] Interdisciplinary Team has updated the ACP Navigator with [place] and Patient Capacity Primary Decision Maker: [facility] [phone] No flowsheet data found. Medical Interventions: Limited additional interventions Other: As far as possible, the palliative care team has discussed with patient / health care proxy about goals of care / treatment preferences for patient. HISTORY:  
 
History obtained from: Pt, family ,chart, staff CHIEF COMPLAINT: Denies pain HPI/SUBJECTIVE: The patient is:  
[x] Verbal and participatory [] Non-participatory due to:  
 
Pt only saying a few words. On 1L NC O2, comfortable. Clinical Pain Assessment (nonverbal scale for severity on nonverbal patients):  
Clinical Pain Assessment Severity: 0 Activity (Movement): Lying quietly, normal position Duration: for how long has pt been experiencing pain (e.g., 2 days, 1 month, years) Frequency: how often pain is an issue (e.g., several times per day, once every few days, constant) FUNCTIONAL ASSESSMENT:  
 
Palliative Performance Scale (PPS): PPS: 30 
 
 
 PSYCHOSOCIAL/SPIRITUAL SCREENING:  
 
Palliative IDT has assessed this patient for cultural preferences / practices and a referral made as appropriate to needs (Cultural Services, Patient Advocacy, Ethics, etc.) Any spiritual / Yarsanism concerns: 
[] Yes /  [x] No 
 
Caregiver Burnout: 
[] Yes /  [x] No /  [] No Caregiver Present Anticipatory grief assessment:  
[x] Normal  / [] Maladaptive ESAS Anxiety: ESAS Depression:    
 
Cannot obtain due to patient factors REVIEW OF SYSTEMS:  
 
Positive and pertinent negative findings in ROS are noted above in HPI. The following systems were [x] reviewed / [] unable to be reviewed as noted in HPI Other findings are noted below. Systems: constitutional, ears/nose/mouth/throat, respiratory, gastrointestinal, genitourinary, musculoskeletal, integumentary, neurologic, psychiatric, endocrine. Positive findings noted below. Modified ESAS Completed by: provider Fatigue: 8 Drowsiness: 0 Pain: 0 Dyspnea: 0 PHYSICAL EXAM:  
 
From RN flowsheet: 
Wt Readings from Last 3 Encounters:  
05/15/20 100 lb (45.4 kg) 03/15/16 126 lb (57.2 kg) 07/10/15 129 lb (58.5 kg) Blood pressure 135/79, pulse 91, temperature 96.7 °F (35.9 °C), resp. rate 30, height 5' 3\" (1.6 m), weight 100 lb (45.4 kg), SpO2 91 %. Pain Scale 1: Adult Nonverbal Pain Scale Last bowel movement, if known:  
 
Constitutional: awake, makes good eye contact, only answers one of my many questions Eyes: pupils equal, anicteric ENMT: no nasal discharge, moist mucous membranes Respiratory: breathing not labored Musculoskeletal: no deformity Skin: warm, dry Neurologic: not following commands consistently, L hemiparesis Psychiatric: no anxiety HISTORY:  
 
Principal Problem: 
  UTI (urinary tract infection) (5/15/2020) Active Problems: Atrial fibrillation with RVR (HCC) () Hx of completed stroke () Dehydration (5/15/2020) FTT (failure to thrive) in adult (5/15/2020) Sepsis ([facility] (5/15/2020) Pleural effusion on right (5/15/2020) Chronic anticoagulation (5/15/2020) Fecal impaction ([facility] [address] (5/15/2020) Past Medical History:  
Diagnosis Date  Aphasia  Atrial fibrillation ([facility]  Hemiparesis affecting left side as late effect of cerebrovascular accident (CVA) ([facility] [address]  Hx of completed stroke No past surgical history on file. No family history on file. History reviewed, no pertinent family history. Social History Tobacco Use  Smoking status: Never Smoker Substance Use Topics  Alcohol use: No  
 
Allergies Allergen Reactions  Aggrenox [Aspirin-Dipyridamole] Nausea and Vomiting  Codeine Other (comments)  
  unknown  Pcn [Penicillins] Other (comments)  
  unknown Current Facility-Administered Medications Medication Dose Route Frequency  polyethylene glycol (MIRALAX) packet 17 g  17 g Oral QHS  metoprolol tartrate (LOPRESSOR) tablet 12.5 mg  12.5 mg Oral DAILY  metoprolol tartrate (LOPRESSOR) tablet 25 mg  25 mg Oral QHS  dextrose 5% - 0.45% NaCl with KCl 20 mEq/L infusion  150 mL/hr IntraVENous CONTINUOUS  
 acetaminophen (TYLENOL) tablet 650 mg  650 mg Oral Q4H PRN  
 diphenhydrAMINE (BENADRYL) capsule 25 mg  25 mg Oral Q4H PRN  
 ondansetron (ZOFRAN) injection 4 mg  4 mg IntraVENous Q4H PRN  
 labetaloL (NORMODYNE;TRANDATE) 20 mg/4 mL (5 mg/mL) injection 20 mg  20 mg IntraVENous Q6H PRN  
 senna-docusate (PERICOLACE) 8.6-50 mg per tablet 1 Tab  1 Tab Oral DAILY  cefepime (MAXIPIME) 2 g in 0.9% sodium chloride (MBP/ADV) 100 mL  2 g IntraVENous Q12H  
 vancomycin (VANCOCIN) 750 mg in 0.9% sodium chloride (MBP/ADV) 250 mL  750 mg IntraVENous Q24H  
 
 
 
 LAB AND IMAGING FINDINGS:  
 
Lab Results Component Value Date/Time WBC 12.0 (H) 05/16/2020 01:36 AM  
 HGB 11.9 05/16/2020 01:36 AM  
 PLATELET 617 98/32/9229 01:36 AM  
 
Lab Results Component Value Date/Time Sodium 145 05/16/2020 01:36 AM  
 Potassium 4.0 05/16/2020 01:36 AM  
 Chloride 115 (H) 05/16/2020 01:36 AM  
 CO2 24 05/16/2020 01:36 AM  
 BUN 26 (H) 05/16/2020 01:36 AM  
 Creatinine 0.68 05/16/2020 01:36 AM  
 Calcium 8.0 (L) 05/16/2020 01:36 AM  
 Magnesium 1.5 (L) 05/16/2020 01:36 AM  
 Phosphorus 1.9 (L) 05/16/2020 01:36 AM  
  
Lab Results Component Value Date/Time AST (SGOT) 20 05/16/2020 01:36 AM  
 Alk. phosphatase 58 05/16/2020 01:36 AM  
 Protein, total 5.3 (L) 05/16/2020 01:36 AM  
 Albumin 2.1 (L) 05/16/2020 01:36 AM  
 Globulin 3.2 05/16/2020 01:36 AM  
 
Lab Results Component Value Date/Time INR 2.5 (H) 07/10/2015 11:42 AM  
 Prothrombin time 26.6 (H) 07/10/2015 11:42 AM  
 aPTT 36.2 07/10/2015 11:42 AM  
  
Lab Results Component Value Date/Time Ferritin 306 (H) 05/15/2020 08:26 PM  
  
No results found for: PH, PCO2, PO2 No components found for: [place] Lab Results Component Value Date/Time CK 27 07/10/2015 11:42 AM  
  
 
 
   
 
Total time: 70 min Counseling / coordination time, spent as noted above: 50 min  
> 50% counseling / coordination?: yes Prolonged service was provided for  []30 min   []75 min in face to face time in the presence of the patient, spent as noted above. Time Start:  
Time End:  
Note: this can only be billed with 77783 (initial) or 21594 (follow up). If multiple start / stop times, list each separately.

## 2020-05-16 NOTE — PROGRESS NOTES
Brian Stiles [address] 
[address] 
[phone] Medical Progress Note NAME: Sissy Baker :  3/20/1925 MRM:  [id number] Date/Time: 2020  9:28 AM 
 
 
  
Subjective: Chief Complaint:  Sepsis: patient is aphasic at baseline and cannot answer my questions ROS: 
(bold if positive, if negative) Unable to obtain due to aphasia Objective:  
 
 
Vitals:  
 
 
  
Last 24hrs VS reviewed since prior progress note. Most recent are: 
 
Visit Vitals BP 95/71 (BP 1 Location: Right arm, BP Patient Position: At rest) Pulse 74 Temp 97.5 °F (36.4 °C) Resp 22 Ht 5' 3\" (1.6 m) Wt 45.4 kg (100 lb) SpO2 96% BMI 17.71 kg/m² SpO2 Readings from Last 6 Encounters:  
20 96% 03/15/16 99%  
07/10/15 95% O2 Flow Rate (L/min): 1 l/min No intake or output data in the 24 hours ending 20 0930 Exam:  
 
Physical Exam: 
 
Gen:  Well-developed, thin, frail, elderly, chronically ill-appearing, in no acute distress HEENT:  Pink conjunctivae, PERRL, hearing intact to voice, moist mucous membranes Neck:  Supple, without masses, thyroid non-tender Resp:  No accessory muscle use, clear breath sounds without wheezes rales or rhonchi 
Card:  No murmurs, normal S1, S2 without thrills, bruits or peripheral edema, 2+ pedal pulses Abd:  Soft, non-tender, non-distended, normoactive bowel sounds are present, no palpable organomegaly and no detectable hernias Lymph:  No cervical or inguinal adenopathy Musc:  No cyanosis or clubbing Skin:  No rashes or ulcers, skin turgor is good, cap refill <2 sec Neuro:  Aphasic, left hemiparesis, does not follow commands appropriately Psych:  No insight, not oriented to person, place and time, lethargic Medications Reviewed: (see below) Lab Data Reviewed: (see below) 
 
______________________________________________________________________ Medications: Current Facility-Administered Medications Medication Dose Route Frequency  magnesium sulfate 2 g/50 ml IVPB (premix or compounded)  2 g IntraVENous Q1H  phosphorus (K PHOS NEUTRAL) 250 mg tablet 2 Tab  2 Tab Oral Q4H  
 polyethylene glycol (MIRALAX) packet 17 g  17 g Oral QHS  metoprolol tartrate (LOPRESSOR) tablet 12.5 mg  12.5 mg Oral DAILY  metoprolol tartrate (LOPRESSOR) tablet 25 mg  25 mg Oral QHS  dextrose 5% - 0.45% NaCl with KCl 20 mEq/L infusion  150 mL/hr IntraVENous CONTINUOUS  
 acetaminophen (TYLENOL) tablet 650 mg  650 mg Oral Q4H PRN  
 diphenhydrAMINE (BENADRYL) capsule 25 mg  25 mg Oral Q4H PRN  
 ondansetron (ZOFRAN) injection 4 mg  4 mg IntraVENous Q4H PRN  
 labetaloL (NORMODYNE;TRANDATE) 20 mg/4 mL (5 mg/mL) injection 20 mg  20 mg IntraVENous Q6H PRN  
 senna-docusate (PERICOLACE) 8.6-50 mg per tablet 1 Tab  1 Tab Oral DAILY  cefepime (MAXIPIME) 2 g in 0.9% sodium chloride (MBP/ADV) 100 mL  2 g IntraVENous Q12H  
 vancomycin (VANCOCIN) 750 mg in 0.9% sodium chloride (MBP/ADV) 250 mL  750 mg IntraVENous Q24H Lab Review:  
 
Recent Labs 05/16/20 
0136 05/15/20 
2026 WBC 12.0* 14.5* HGB 11.9 14.8 HCT 36.9 45.8  199 Recent Labs 05/16/20 
0136 05/15/20 
2026  140  
K 4.0 4.1 * 109* CO2 24 27 * 142* BUN 26* 27* CREA 0.68 0.78  
CA 8.0* 9.4 MG 1.5* 1.9 PHOS 1.9* 1.7* ALB 2.1* 3.0* TBILI 0.8 1.2* SGOT 20 23 ALT 22 25 Lab Results Component Value Date/Time Glucose (POC) 94 07/10/2015 11:38 AM  
 
No results for input(s): PH, PCO2, PO2, HCO3, FIO2 in the last 72 hours. No results for input(s): INR, INREXT, INREXT in the last 72 hours. No results found for: SDES Lab Results Component Value Date/Time Culture result: NO GROWTH AFTER 8 HOURS 05/15/2020 08:24 PM  
 Culture result:  03/15/2016 03:15 AM  
  [id number] COLONIES/mL MIXED GRAM POSITIVE MAGALI, PROBABLE SKIN/GENITAL CONTAMINATION. Assessment:  
 
Principal Problem: 
  UTI (urinary tract infection) (5/15/2020) Active Problems: 
  Atrial fibrillation with RVR (HCC) () Hx of completed stroke () Dehydration (5/15/2020) FTT (failure to thrive) in adult (5/15/2020) Sepsis ([address] (5/15/2020) Pleural effusion on right (5/15/2020) Chronic anticoagulation (5/15/2020) Fecal impaction ([address] (5/15/2020) Plan:  
 
Principal Problem: 
  UTI (urinary tract infection) (5/15/2020) - continue antibiotics as above pending urine culture Active Problems: 
  Atrial fibrillation with RVR (HCC) ()/Hx of completed stroke ()/Chronic anticoagulation (5/15/2020) - rate controlled after hydration  
 - anticoagulation on hold due to probable procedure Dehydration (5/15/2020) - resolving on IV fluids FTT (failure to thrive) in adult (5/15/2020) - suspect this is due to end stage disease which is not correctable but there is also likely and acute component of decompensation related to UTI 
 - Palliative Care to see Sepsis ([address] (5/15/2020) 
 - UTI is likely source, on broad spectrum antibiotics for now Pleural effusion on right (5/15/2020) - Pulmonary to evaluate, I would favor a \"less is more\" approach myself, if it will increase the patient's comfort it would be reasonable, in my opinion, to proceed with thoracentesis Fecal impaction ([address] (5/15/2020) 
 - enema and bowel agents ordered Total time spent in patient care: 35 minutes Care Plan discussed with: Patient, Care Manager, Nursing Staff and Dr. Geeta Dobbins Discussed:  Code Status, Care Plan and D/C Planning Prophylaxis:  not needed Disposition:  TBD, favor hospice of some sort at discharge 
        
___________________________________________________ Attending Physician: Josefina Payne MD

## 2020-05-16 NOTE — CONSULTS
[facility] [place] Pulmonary, Critical Care, and Sleep Medicine Initial Patient Consult Name: Christophe Thomas MRN: [id number] : 3/20/1925 Hospital: [facility] Date: 2020 IMPRESSION:  
· Acute hypoxemic respiratory failure · afib w/ RVR 
· UTI · R pleural effusion · H/o CVA w/ L sided deficits and aphasia · H/o dementia RECOMMENDATIONS:  
· Supplemental O2 as needed to keep sats > 90% · COVID PCR pending · Echo pending · Continue abx · Follow cultures · Check TSH and MRSA screen · INR on Monday · No plans for thoracentesis this weekend as last dose of eliquis was yesterday and COVID test pending. Will reeval for need on Monday DNR/DNI Note plans for hospice on discharge Thank you for the consult Will see as needed tomorrow Subjective:  
 
Asked to see Ms. Lisa Glasgow by Dr. Raghav Colon for evaluation of pleural effusion. Ms. Lisa Glasgow is a 79yo female w/ history of CVA w/ L sided deficits and aphasia, dementia and afib who presented to the ER on 5/15 for evaluation of shortness of breath, lethargy and fever (Tm 101.7). Unable to obtain any further history from the patient due to dementia and aphasia. 5/15 - CT chest: R pleural effusion 
        - CT abd/pelvis: fecal impaction *all cultures NGTD *RVP negative Past Medical History:  
Diagnosis Date  Aphasia  Atrial fibrillation ([facility]  Hemiparesis affecting left side as late effect of cerebrovascular accident (CVA) ([facility]  Hx of completed stroke No past surgical history on file. Prior to Admission medications Medication Sig Start Date End Date Taking? Authorizing Provider  
metoprolol tartrate (LOPRESSOR) 25 mg tablet Take 25 mg by mouth nightly. Yes Provider, Historical  
apixaban (ELIQUIS) 2.5 mg tablet Take 2.5 mg by mouth two (2) times a day.  Indications: treatment to prevent blood clots in chronic atrial fibrillation   Yes Other, MD Shashank  
 magnesium oxide 250 mg magnesium tablet Take 250 mg by mouth daily as needed (constipation). Yes Davis, MD Shashank  
metoprolol (LOPRESSOR) 25 mg tablet Take 12.5 mg by mouth daily. Yes Davis, MD Shashank  
simvastatin (ZOCOR) 10 mg tablet Take 10 mg by mouth nightly. Yes Other, MD Shashank  
 
Allergies Allergen Reactions  Aggrenox [Aspirin-Dipyridamole] Nausea and Vomiting  Codeine Other (comments)  
  unknown  Pcn [Penicillins] Other (comments)  
  unknown Social History Tobacco Use  Smoking status: Never Smoker Substance Use Topics  Alcohol use: No  
  
No family history on file. Current Facility-Administered Medications Medication Dose Route Frequency  polyethylene glycol (MIRALAX) packet 17 g  17 g Oral QHS  metoprolol tartrate (LOPRESSOR) tablet 12.5 mg  12.5 mg Oral DAILY  metoprolol tartrate (LOPRESSOR) tablet 25 mg  25 mg Oral QHS  dextrose 5% - 0.45% NaCl with KCl 20 mEq/L infusion  150 mL/hr IntraVENous CONTINUOUS  
 senna-docusate (PERICOLACE) 8.6-50 mg per tablet 1 Tab  1 Tab Oral DAILY  cefepime (MAXIPIME) 2 g in 0.9% sodium chloride (MBP/ADV) 100 mL  2 g IntraVENous Q12H  
 vancomycin (VANCOCIN) 750 mg in 0.9% sodium chloride (MBP/ADV) 250 mL  750 mg IntraVENous Q24H Review of Systems: 
Review of systems not obtained due to patient factors. Objective:  
Vital Signs:   
Visit Vitals /79 (BP 1 Location: Right arm, BP Patient Position: At rest) Pulse 88 Temp 96.7 °F (35.9 °C) Resp 30 Ht 5' 3\" (1.6 m) Wt 45.4 kg (100 lb) SpO2 91% BMI 17.71 kg/m² O2 Device: Nasal cannula O2 Flow Rate (L/min): 1 l/min Temp (24hrs), Av.9 °F (37.2 °C), Min:96.7 °F (35.9 °C), Max:101.7 °F (38.7 °C) Intake/Output:  
Last shift:      No intake/output data recorded. Last 3 shifts: No intake/output data recorded. No intake or output data in the 24 hours ending 20 1658 Physical Exam:  
General:  awake Head:  NCAT Eyes:    
Nose: Throat:   
Neck:   
Back:     
Lungs:   diminished Chest wall:    
Heart:  irregular Abdomen:   soft Extremities: No edema Pulses:   
Skin:   
Lymph nodes:   
Neurologic: Aphasic Exam limited to avoid unnecessary exposure and to preserve PPE during [address] pandemic. Data review:  
 
Recent Results (from the past 24 hour(s)) EKG, 12 LEAD, INITIAL Collection Time: 05/15/20  7:48 PM  
Result Value Ref Range Ventricular Rate 142 BPM  
 Atrial Rate 150 BPM  
 QRS Duration 80 ms  
 Q-T Interval 256 ms  
 QTC Calculation (Bezet) 393 ms Calculated R Axis -124 degrees Diagnosis Atrial fibrillation with rapid ventricular response Right superior axis deviation Right ventricular hypertrophy Inferior infarct , age undetermined Abnormal ECG No previous ECGs available RESPIRATORY PANEL,PCR,NASOPHARYNGEAL Collection Time: 05/15/20  8:11 PM  
Result Value Ref Range Adenovirus Not detected NOTD Coronavirus 229E Not detected NOTD Coronavirus HKU1 Not detected NOTD Coronavirus CVNL63 Not detected NOTD Coronavirus OC43 Not detected NOTD Metapneumovirus Not detected NOTD Rhinovirus and Enterovirus Not detected NOTD Influenza A Not detected NOTD Influenza A, subtype H1 Not detected NOTD Influenza A, subtype H3 Not detected NOTD INFLUENZA A H1N1 PCR Not detected NOTD Influenza B Not detected NOTD Parainfluenza 1 Not detected NOTD Parainfluenza 2 Not detected NOTD Parainfluenza 3 Not detected NOTD Parainfluenza virus 4 Not detected NOTD    
 RSV by PCR Not detected NOTD B. parapertussis, PCR Not detected NOTD Bordetella pertussis - PCR Not detected NOTD Chlamydophila pneumoniae DNA, QL, PCR Not detected NOTD Mycoplasma pneumoniae DNA, QL, PCR Not detected NOTD    
SARS-COV-2 Collection Time: 05/15/20  8:11 PM  
Result Value Ref Range  Specimen source Nasopharyngeal    
 SARS-CoV-2 PENDING Specimen source Nasopharyngeal    
CULTURE, BLOOD, PAIRED Collection Time: 05/15/20  8:24 PM  
Result Value Ref Range Special Requests: NO SPECIAL REQUESTS Culture result: NO GROWTH AFTER 8 HOURS    
LACTIC ACID Collection Time: 05/15/20  8:24 PM  
Result Value Ref Range Lactic acid 1.9 0.4 - 2.0 MMOL/L  
CBC WITH AUTOMATED DIFF Collection Time: 05/15/20  8:26 PM  
Result Value Ref Range WBC 14.5 (H) 3.6 - 11.0 K/uL  
 RBC 4.64 3.80 - 5.20 M/uL  
 HGB 14.8 11.5 - 16.0 g/dL HCT 45.8 35.0 - 47.0 % MCV 98.7 80.0 - 99.0 FL  
 MCH 31.9 26.0 - 34.0 PG  
 MCHC 32.3 30.0 - 36.5 g/dL  
 RDW 13.5 11.5 - 14.5 % PLATELET 809 918 - 498 K/uL MPV 9.9 8.9 - 12.9 FL  
 NRBC 0.0 0  WBC ABSOLUTE NRBC 0.00 0.00 - 0.01 K/uL NEUTROPHILS 85 (H) 32 - 75 % LYMPHOCYTES 4 (L) 12 - 49 % MONOCYTES 10 5 - 13 % EOSINOPHILS 0 0 - 7 % BASOPHILS 0 0 - 1 % IMMATURE GRANULOCYTES 1 (H) 0.0 - 0.5 % ABS. NEUTROPHILS 12.3 (H) 1.8 - 8.0 K/UL  
 ABS. LYMPHOCYTES 0.6 (L) 0.8 - 3.5 K/UL  
 ABS. MONOCYTES 1.5 (H) 0.0 - 1.0 K/UL  
 ABS. EOSINOPHILS 0.0 0.0 - 0.4 K/UL  
 ABS. BASOPHILS 0.0 0.0 - 0.1 K/UL  
 ABS. IMM. GRANS. 0.1 (H) 0.00 - 0.04 K/UL  
 DF SMEAR SCANNED    
 RBC COMMENTS NORMOCYTIC, NORMOCHROMIC METABOLIC PANEL, COMPREHENSIVE Collection Time: 05/15/20  8:26 PM  
Result Value Ref Range Sodium 140 136 - 145 mmol/L Potassium 4.1 3.5 - 5.1 mmol/L Chloride 109 (H) 97 - 108 mmol/L  
 CO2 27 21 - 32 mmol/L Anion gap 4 (L) 5 - 15 mmol/L Glucose 142 (H) 65 - 100 mg/dL BUN 27 (H) 6 - 20 MG/DL Creatinine 0.78 0.55 - 1.02 MG/DL  
 BUN/Creatinine ratio 35 (H) 12 - 20 GFR est AA >60 >60 ml/min/1.73m2 GFR est non-AA >60 >60 ml/min/1.73m2 Calcium 9.4 8.5 - 10.1 MG/DL Bilirubin, total 1.2 (H) 0.2 - 1.0 MG/DL  
 ALT (SGPT) 25 12 - 78 U/L  
 AST (SGOT) 23 15 - 37 U/L Alk.  phosphatase 78 45 - 117 U/L  
 Protein, total 7.0 6.4 - 8.2 g/dL Albumin 3.0 (L) 3.5 - 5.0 g/dL Globulin 4.0 2.0 - 4.0 g/dL A-G Ratio 0.8 (L) 1.1 - 2.2 MAGNESIUM Collection Time: 05/15/20  8:26 PM  
Result Value Ref Range Magnesium 1.9 1.6 - 2.4 mg/dL PHOSPHORUS Collection Time: 05/15/20  8:26 PM  
Result Value Ref Range Phosphorus 1.7 (L) 2.6 - 4.7 MG/DL  
NT-PRO BNP Collection Time: 05/15/20  8:26 PM  
Result Value Ref Range NT pro-BNP 1,438 (H) <450 PG/ML  
TROPONIN I Collection Time: 05/15/20  8:26 PM  
Result Value Ref Range Troponin-I, Qt. <0.05 <0.05 ng/mL CK W/ REFLX CKMB Collection Time: 05/15/20  8:26 PM  
Result Value Ref Range CK 25 (L) 26 - 192 U/L  
SAMPLES BEING HELD Collection Time: 05/15/20  8:26 PM  
Result Value Ref Range SAMPLES BEING HELD 1SST,1RED,1BLU   
 COMMENT Add-on orders for these samples will be processed based on acceptable specimen integrity and analyte stability, which may vary by analyte. PROCALCITONIN Collection Time: 05/15/20  8:26 PM  
Result Value Ref Range Procalcitonin 0.14 ng/mL FERRITIN Collection Time: 05/15/20  8:26 PM  
Result Value Ref Range Ferritin 306 (H) 8 - 252 NG/ML  
LD Collection Time: 05/15/20  8:26 PM  
Result Value Ref Range  81 - 246 U/L  
D DIMER Collection Time: 05/15/20  8:26 PM  
Result Value Ref Range D-dimer 1.43 (H) 0.00 - 0.65 mg/L FEU  
URINALYSIS W/ REFLEX CULTURE Collection Time: 05/15/20  8:40 PM  
Result Value Ref Range Color DARK YELLOW Appearance CLOUDY (A) CLEAR Specific gravity 1.024 1.003 - 1.030    
 pH (UA) 5.5 5.0 - 8.0 Protein 100 (A) NEG mg/dL Glucose Negative NEG mg/dL Ketone TRACE (A) NEG mg/dL Bilirubin Negative NEG Blood LARGE (A) NEG Urobilinogen 0.2 0.2 - 1.0 EU/dL Nitrites Negative NEG Leukocyte Esterase SMALL (A) NEG    
 WBC 20-50 0 - 4 /hpf  
 RBC >100 (H) 0 - 5 /hpf Epithelial cells FEW FEW /lpf Bacteria 3+ (A) NEG /hpf  
 UA:UC IF INDICATED URINE CULTURE ORDERED (A) CNI    
CULTURE, URINE Collection Time: 05/15/20  8:40 PM  
Result Value Ref Range Special Requests: NO SPECIAL REQUESTS [place] Count PENDING Culture result: CULTURE IN PROGRESS,FURTHER UPDATES TO FOLLOW    
CBC W/O DIFF Collection Time: 05/16/20  1:36 AM  
Result Value Ref Range WBC 12.0 (H) 3.6 - 11.0 K/uL  
 RBC 3.70 (L) 3.80 - 5.20 M/uL  
 HGB 11.9 11.5 - 16.0 g/dL HCT 36.9 35.0 - 47.0 % MCV 99.7 (H) 80.0 - 99.0 FL  
 MCH 32.2 26.0 - 34.0 PG  
 MCHC 32.2 30.0 - 36.5 g/dL  
 RDW 13.3 11.5 - 14.5 % PLATELET 484 390 - 838 K/uL MPV 10.2 8.9 - 12.9 FL  
 NRBC 0.0 0  WBC ABSOLUTE NRBC 0.00 0.00 - 0.01 K/uL MAGNESIUM Collection Time: 05/16/20  1:36 AM  
Result Value Ref Range Magnesium 1.5 (L) 1.6 - 2.4 mg/dL METABOLIC PANEL, COMPREHENSIVE Collection Time: 05/16/20  1:36 AM  
Result Value Ref Range Sodium 145 136 - 145 mmol/L Potassium 4.0 3.5 - 5.1 mmol/L Chloride 115 (H) 97 - 108 mmol/L  
 CO2 24 21 - 32 mmol/L Anion gap 6 5 - 15 mmol/L Glucose 180 (H) 65 - 100 mg/dL BUN 26 (H) 6 - 20 MG/DL Creatinine 0.68 0.55 - 1.02 MG/DL  
 BUN/Creatinine ratio 38 (H) 12 - 20 GFR est AA >60 >60 ml/min/1.73m2 GFR est non-AA >60 >60 ml/min/1.73m2 Calcium 8.0 (L) 8.5 - 10.1 MG/DL Bilirubin, total 0.8 0.2 - 1.0 MG/DL  
 ALT (SGPT) 22 12 - 78 U/L  
 AST (SGOT) 20 15 - 37 U/L Alk. phosphatase 58 45 - 117 U/L Protein, total 5.3 (L) 6.4 - 8.2 g/dL Albumin 2.1 (L) 3.5 - 5.0 g/dL Globulin 3.2 2.0 - 4.0 g/dL A-G Ratio 0.7 (L) 1.1 - 2.2 PHOSPHORUS Collection Time: 05/16/20  1:36 AM  
Result Value Ref Range Phosphorus 1.9 (L) 2.6 - 4.7 MG/DL Imaging: 
I have personally reviewed the patients radiographs and have reviewed the reports: 
  
 
  
Diane Singleton MD

## 2020-05-16 NOTE — PROGRESS NOTES
Problem: Falls - Risk of 
Goal: *Absence of Falls Description: Document Brooke Starch Fall Risk and appropriate interventions in the flowsheet. Outcome: Progressing Towards Goal 
Note: Fall Risk Interventions: 
Mobility Interventions: Bed/chair exit alarm Mentation Interventions: Bed/chair exit alarm Medication Interventions: Patient to call before getting OOB Elimination Interventions: Toileting schedule/hourly rounds Problem: Pressure Injury - Risk of 
Goal: *Prevention of pressure injury Description: Document Kan Scale and appropriate interventions in the flowsheet. Outcome: Progressing Towards Goal 
Note: Pressure Injury Interventions: 
Sensory Interventions: Assess changes in LOC Moisture Interventions: Check for incontinence Q2 hours and as needed Activity Interventions: PT/OT evaluation Mobility Interventions: HOB 30 degrees or less Nutrition Interventions: Document food/fluid/supplement intake Friction and Shear Interventions: HOB 30 degrees or less, Minimize layers

## 2020-05-16 NOTE — ED NOTES
TRANSFER - OUT REPORT: 
 
Verbal report given to Amara RN(name) on Sherwin West  being transferred to 4th floor(unit) for routine progression of care Report consisted of patients Situation, Background, Assessment and  
Recommendations(SBAR). Information from the following report(s) SBAR, ED Summary, [facility] and Cardiac Rhythm Afib was reviewed with the receiving nurse. Lines:  
Peripheral IV 05/15/20 Right Forearm (Active) Site Assessment Clean, dry, & intact 5/15/2020  8:32 PM  
Phlebitis Assessment 0 5/15/2020  8:32 PM  
Infiltration Assessment 0 5/15/2020  8:32 PM  
Dressing Status Clean, dry, & intact 5/15/2020  8:32 PM  
   
Peripheral IV 05/15/20 Left Wrist (Active) Opportunity for questions and clarification was provided. Patient transported with: 
 [facility]

## 2020-05-16 NOTE — PROGRESS NOTES
Chart accessed to assist primary RN concerned due to patients lethargy. RN at bedside with primary RN, able to arouse patient and observe patient swallow water without difficulty.

## 2020-05-16 NOTE — PROGRESS NOTES
Patient's HR went up to the 130s-140s when we were turning her in bed, nonsustaining. BP was also more elevated at 160/89 around 1700. Labetalol given. Dr Cleveland Connor notified.

## 2020-05-16 NOTE — PROGRESS NOTES
[facility] Adult  Hospitalist Group Advance Care Planning Note Name: Leon Hill YOB: 1925 MRN: [id number] Admission Date: 5/15/2020  7:01 PM 
 
Date of discussion: 5/15/2020 Active Diagnoses: 
 
Hospital Problems  Date Reviewed: 5/15/2020 Dyspnea Lethargy Anorexia Aphasia Atrial fibrillation with RVR ([facility] Hemiparesis affecting left side as late effect of cerebrovascular accident (CVA) ([facility] Hx of completed stroke * (Principal) UTI (urinary tract infection) Back pain Leukocytosis Dehydration FTT (failure to thrive) in adult Sepsis ([facility] Fever Tachycardia Tachypnea Pleural effusion on right Chronic anticoagulation Fecal impaction ([facility] Weight loss These active diagnoses are of sufficient risk that focused discussion on advance care planning is indicated in order to allow the patient to thoughtfully consider personal goals of care, and if situations arise that prevent the ability to personally give input, to ensure appropriate representation of their personal desires for different levels and aggressiveness of care. Discussion:  
 
Persons present and participating in discussion: Leon RamoskatelinVicki MD, daughter Discussion: sepsis treatment and prognosis, in setting of pleural effusion and FTT. Daughter is MPOA. Patient is DNR. Daughter considering limited workup and treatment if cancer or other issue suspected. I consulted palliative care. Time Spent:  
 
Total time spent face-to-face in education and discussion: 19 minutes.   
 
Vicki Panda MD 
Date of Service:  5/15/2020 
11:44 PM

## 2020-05-16 NOTE — PROGRESS NOTES
Reason for Admission:   Increased SOB at home. Awaiting pulmonary consult. RUR Score:  14% Plan for utilizing home health:     Depends on need, will work with [employer] [place] if needed short term after discharge from the hospital.  
 
PCP: First and Last name:  Karena Dennis MD 
 
 Current Advanced Directive/Advance Care Plan: On file, no changes since scanned in 3/2016 Transition of Care Plan:                   
 
2:36 PM 
Approached by Dr. Shmuel Rubio who has done further education with POA as well as local daughter and they are now interested in Hospice for follow up and support at discharge. Referral called to [address] [place] [address] per sons agreement. 11:26 AM 
Spoke with poa son over the phone for assessment. Cj Dale [phone]) Pt lives with daughter in [place] 50% of the time Amy Smith [phone]), son and other daughter in [place] 25% of the time each, and they have been doing this since her stroke five years ago. She still owns her home in [place] but they are working to put it on the market. Found it more manageable to do it at their homes. They hae used home Instead for private duty when needed and a friend, Sam Voss, who is a retired nurse will sometimes give them respite. Pool Caraballo said that he appreciated the thought of Hospice but he does not feel they need additional people or supports in the home at this time. She had Home health some years back and they found it disruptive to the home. IF HH is recommended as a transition from [address] [place] [address] to Home upon discharge for short term, he is agreeable to [address] [place] [address] at that time. Spoke with Dr. Mary Kumar and work up in progress, identified with son that CM would be contacting first of the week for further planning. Care Management Interventions PCP Verified by CM: Yes(Dr Kay Jackson) Palliative Care Criteria Met (RRAT>21 & CHF Dx)? : (is being followed by Palliative Care) Mode of Transport at Discharge: Other (see comment)(famiy car) Transition of Care Consult (CM Consult): Home Health(family agreeable to Home Health if needed shortterm for follow up nursing visits) [address]: Yes Current Support Network: Other(Has a home in [place], but is cared for by family in their homes. Daughter in [place] 50% of the time, son and other daughter take other 25% of the time.) Confirm Follow Up Transport: Family The Patient and/or Patient Representative was Provided with a Choice of Provider and Agrees with the Discharge Plan?: Yes Name of the Patient Representative Who was Provided with a Choice of Provider and Agrees with the Discharge Plan: [place], son, Saroj Carry [phone] Freedom of Choice List was Provided with Basic Dialogue that Supports the Patient's Individualized Plan of Care/Goals, Treatment Preferences and Shares the Quality Data Associated with the Providers?: Yes

## 2020-05-16 NOTE — HOSPICE
[facility] Liaison note: 
 
Spoke with CHRISTY Estes after reviewing patient chart, family does not wish to have hospice at this time. Will continue to follow if services are needed. [address] [address] [facility] with Dr. Claritza Arce, family is interested in home hospice in the coming week after patient receives treatment. Will reach out to daughter later today to begin process. [facility] at length with daughter Franklin Zepeda, will check on patient tomorrow morning and assess mobility. Franklin Zepeda does not want a hospital bed at this time, may need oxygen and travel wheelchair. Family is agreeable to hospice care at home. We have an admission slot available on Tuesday. Will reserve for now as patient awaiting pulmonology consult and possible further treatment. Thank you, 
 
Marlen Cameron Rn 
[employer]

## 2020-05-16 NOTE — ACP (ADVANCE CARE PLANNING)
Advance Care Planning Advance Care Planning Activator (Inpatient) Conversation Note Date of ACP Conversation: 5/15/2020 Conversation Conducted with:  Edgar Arreaga [phone] ACP Activator: [employer] [facility] Care Decision Maker: michelle Gallardo Care Preferences Ventilation: \"If she were in her present state of health and suddenly became very ill and were unable to breathe on her own, what would her preference be about the use of a ventilator (breathing machine) if it were available to her? \" If patient would desire the use of a ventilator (breathing machine), answer NO Resuscitation \"CPR works best to restart the heart when there is a sudden event, like a heart attack, in someone who is otherwise healthy. Unfortunately, CPR does not typically restart the heart for people who have serious health conditions or who are very sick. \" \"In the event her heart stopped as a result of an underlying serious health condition, would she want attempts to be made to restart her heart NO 
 
 
 
 
[] Yes  [x] No   Educated Patient / Decision Maker regarding differences between Advance Directives and portable DNR orders. (he was aware, has on file and it has not changed) Length of ACP Conversation in minutes:  30 
 
Conversation Outcomes: 
[x] ACP discussion completed 
[] Existing advance directive reviewed with patient; no changes to patient's previously recorded wishes 
 
 [] New Advance Directive completed 
 [] Portable Do Not Rescitate prepared for Provider review and signature 
[] POLST/POST/MOLST/MOST prepared for Provider review and signature Follow-up plan:   
[] Schedule follow-up conversation to continue planning 
[] Referred individual to Provider for additional questions/concerns  
[] Advised patient/agent/surrogate to review completed ACP document and update if needed with changes in condition, patient preferences or care setting [x] This note routed to one or more involved healthcare providers

## 2020-05-16 NOTE — H&P
[facility] Hospitalist Admission Note NAME:  Mi Mcgovern :   3/20/1925 MRN:  [id number] PCP:  Etienne Garzon MD  
 
Date/Time of service:  5/15/2020 9:56 PM 
 
  
  
Subjective: CHIEF COMPLAINT: dyspnea HISTORY OF PRESENT ILLNESS:    
Ms. Camryn Gann is a 80 y.o.  female who presented to the Emergency Department complaining of dyspnea. She is a limited historian due to prior CVA nd dementia. Daughter is present. Dyspnea associated with days of anorexia, lethargy. ER finds fever and sepsis criteria. CT finds large R pleural effusion and fecal impaction. UA suggests UTI. She is cachectic and lost 25 lbs in 4 years. We will admit her for management. Past Medical History:  
Diagnosis Date  Aphasia  Atrial fibrillation ([address]  Hemiparesis affecting left side as late effect of cerebrovascular accident (CVA) ([facility] [facility]  Hx of completed stroke No past surgical history on file. Social History Tobacco Use  Smoking status: Never Smoker Substance Use Topics  Alcohol use: No  
  
 
No family history on file. Family hx cannot be fully assessed, since the patient cannot provide information Allergies Allergen Reactions  Aggrenox [Aspirin-Dipyridamole] Nausea and Vomiting  Codeine Other (comments)  
  unknown  Pcn [Penicillins] Other (comments)  
  unknown Prior to Admission medications Medication Sig Start Date End Date Taking? Authorizing Provider  
metoprolol tartrate (LOPRESSOR) 25 mg tablet Take 25 mg by mouth nightly. Yes Provider, Historical  
apixaban (ELIQUIS) 2.5 mg tablet Take 2.5 mg by mouth two (2) times a day. Indications: treatment to prevent blood clots in chronic atrial fibrillation   Yes Other, MD Shashank  
magnesium oxide 250 mg magnesium tablet Take 250 mg by mouth daily as needed (constipation).    Yes Shashank Cannon MD  
 metoprolol (LOPRESSOR) 25 mg tablet Take 12.5 mg by mouth daily. Yes Davis, MD Shashank  
simvastatin (ZOCOR) 10 mg tablet Take 10 mg by mouth nightly. Yes Other, MD Shashank  
 
 
Review of Systems: 
(bold if positive, if negative) Gen:   weight loss, fever, chills, fatigueEyes:  ENT:  CVS:  Pulm:  dyspneaGI:  nauseaconstipationGU:  MS:  weaknessSkin:  Psych:  Endo:  Hem:  Renal:  Neuro:  weakness Objective: VITALS:   
Vital signs reviewed; most recent are: 
 
Visit Vitals /63 Pulse 94 Temp (!) 101.7 °F (38.7 °C) Resp (!) 39 Ht 5' 3\" (1.6 m) Wt 45.4 kg (100 lb) SpO2 95% BMI 17.71 kg/m² SpO2 Readings from Last 6 Encounters:  
05/15/20 95% 03/15/16 99%  
07/10/15 95% No intake or output data in the 24 hours ending 05/15/20 2156 Exam:  
 
Physical Exam: 
 
Gen:  Frail, cachectic, ill appearing, in no acute distress HEENT:  Pink conjunctivae, PERRL, hearing intact to voice, dry mucous membranes Neck:  Supple, without masses, thyroid non-tender Resp:  No accessory muscle use, reduced R side breath sounds without wheezes rales or rhonchi 
Card:  No murmurs, normal S1, S2 without thrills, bruits or peripheral edema Abd:  Soft, non-tender, distended, reduced bowel sounds are present, no mass Lymph:  No cervical or inguinal adenopathy Musc:  No cyanosis or clubbing Skin:  No rashes or ulcers, skin turgor is good Neuro:  Cranial nerves are grossly intact, general motor weakness, does not follows commands appropriately Psych:  Poor insight, oriented to person Labs: 
 
Recent Labs 05/15/20 
2026 WBC 14.5* HGB 14.8 HCT 45.8  Recent Labs 05/15/20 
2026   
K 4.1 * CO2 27 * BUN 27* CREA 0.78 CA 9.4 MG 1.9 PHOS 1.7* ALB 3.0* TBILI 1.2* SGOT 23 ALT 25 Lab Results Component Value Date/Time  Glucose (POC) 94 07/10/2015 11:38 AM  
 
 No results for input(s): PH, PCO2, PO2, HCO3, FIO2 in the last 72 hours. No results for input(s): INR, INREXT in the last 72 hours. All Micro Results Procedure Component Value Units Date/Time CULTURE, BLOOD, PAIRED [[id number]] Collected:  05/15/20 2024 Order Status:  Completed Specimen:  Blood Updated:  05/15/20 2050 CULTURE, URINE [[id number]] Collected:  05/15/20 2040 Order Status:  Completed Specimen:  Cath Urine Updated:  05/15/20 2047 RESPIRATORY PANEL,PCR,NASOPHARYNGEAL [[id number]] Collected:  05/15/20 2011 Order Status:  Completed Specimen:  NASOPHARYNGEAL SWAB Updated:  05/15/20 2043 I have reviewed previous records Assessment and Plan:  
  
UTI (urinary tract infection) - POA, possible, based on UA. Cefepime started in ER. Monitor CX. Sepsis / Fever / Tachycardia / Leukocytosis - POA, unclear etiology. Broad DDX. UTI, bacterial PNA, empyema, COVID, other. Continue cefepime and vancomycin. Monitor cx. Check COVID and RVP. Atrial fibrillation with RVR / Chronic anticoagulation - RVR POA likely driven by fever and dehydration. Better after hydration. Prn labetalol. Hold anticoagulation for possible tap, etc. 
 
Pleural effusion on right / Dyspnea / Tachypnea - Large unilateral effusion, DDX empyema vs paraneoplastic vs other. Pulmonary consult to consider tap and labs. Fecal impaction / Anorexia / Back pain - POA, unclear trigger, but immobility, dehydration, etc may contribute. Clear diet. Laxatives. Dehydration - POA due to poor PO intake. Hydrate and follow Hemiparesis affecting left side as late effect of cerebrovascular accident (CVA) / Hx of completed stroke / Aphasia FTT (failure to thrive) in adult / Cachectic / Weight loss - POA and due to any or all of the above. Grim medium term prognosis. Palliative consult. Lethargy - POA due to above. Fall precautions. I  limited rehab potential. 
  
 
Telemetry reviewed:   NICOLETTE Risk of deterioration: high Total time spent with patient: 79 Minutes I personally reviewed chart, notes, data and current medications in the medical record. I have personally examined and treated the patient at bedside during this period. Care Plan discussed with: Patient, Family, Nursing Staff and >50% of time spent in counseling and coordination of care Discussed:  Care Plan      
___________________________________________________ Attending Physician: Lemuel De Anda MD

## 2020-05-16 NOTE — PROGRESS NOTES
Spiritual Care Assessment/Progress Note [address] 
 
 
NAME: Saul Ellis      MRN: [id number] AGE: 80 y.o. SEX: female Jew Affiliation: Jainism  
Language: [place] 5/16/2020     Total Time (in minutes): 10 Spiritual Assessment begun in [facility] POST SURG ORT 2 through conversation with: 
  
    []Patient        [x] Family    [] Friend(s) Reason for Consult: Initial/Spiritual assessment, patient floor Spiritual beliefs: (Please include comment if needed) [x] Identifies with a donna tradition:     
   [] Supported by a donna community:        
   [] Claims no spiritual orientation:       
   [] Seeking spiritual identity:            
   [] Adheres to an individual form of spirituality:       
   [] Not able to assess:                   
 
    
Identified resources for coping:  
   [] Prayer                           
   [] Music                  [] Guided Imagery [x] Family/friends                 [] Pet visits [] Devotional reading                         [] Unknown 
   [] Other:                                         
 
 
Interventions offered during this visit: (See comments for more details) Family/Friend(s): Affirmation of emotions/emotional suffering, Prayer (assurance of), Initial Assessment Plan of Care: 
 
 [] Support spiritual and/or cultural needs  
 [] Support AMD and/or advance care planning process    
 [] Support grieving process 
 [] Coordinate Rites and/or Rituals  
 [] Coordination with community clergy [] No spiritual needs identified at this time 
 [] Detailed Plan of Care below (See Comments)  [] Make referral to Music Therapy 
[] Make referral to Pet Therapy    
[] Make referral to Addiction services 
[] Make referral to [facility] 
[] Make referral to Spiritual Care Partner 
[] No future visits requested       
[x] Follow up visits as needed Comments:   visited pt, Miss Sharyle Corrigan, at the [address] for palliative care initial spiritual assessment. Consulted with pt's nurse, who indicated pt was unable to hold a coversation. Nurse was about to call family (pt's daughter), so  stood by and extended support to Miss Paulson's daughter after nurse had finished talking to her. She indicated that she was doing ok, lots of family support from her  and siblings. Daughter affirmed that donna was important to mother, and that she was at peace with mother's situation.  provided listening presence and affirmation; advised of spiritual care availability and assurance of prayer. Spiritual care is available as needed or upon request.  
Visited by: Shelby Riley. To darien rajput: [phone])

## 2020-05-16 NOTE — HOSPICE
Patients address for delivery would be: 
 
Zabrina Patient [address] [facility], [address]

## 2020-05-16 NOTE — ED PROVIDER NOTES
The patient is a 27-year-old female with a past medical history significant for TIA, stroke with chronic left-sided deficit, atrial fibrillation who presents to the ED with her daughter at the bedside with a complaint of shortness of breath for the past 2 days, decreased appetite of activity, lethargy, and low back pain in the past 24 hours. The daughter states that [facility] and was instructed to come to the ER for further evaluation. The patient has a chronic expressive aphasia and chronic left-sided weakness due to the stroke. She is on chronic anticoagulation with Eliquis. She is DNR. Daughter denies any sick contact at home, recent travel, headache, nausea, vomiting, diarrhea, prior history of the same. Past Medical History:  
Diagnosis Date  Atrial fibrillation ([facility] [address]  Stroke [facility]) 2014  TIA (transient ischemic attack) No past surgical history on file. No family history on file. Social History Socioeconomic History  Marital status:  Spouse name: Not on file  Number of children: Not on file  Years of education: Not on file  Highest education level: Not on file Occupational History  Not on file Social Needs  Financial resource strain: Not on file  Food insecurity Worry: Not on file Inability: Not on file  Transportation needs Medical: Not on file Non-medical: Not on file Tobacco Use  Smoking status: Never Smoker Substance and Sexual Activity  Alcohol use: No  
 Drug use: Not on file  Sexual activity: Not on file Lifestyle  Physical activity Days per week: Not on file Minutes per session: Not on file  Stress: Not on file Relationships  Social connections Talks on phone: Not on file Gets together: Not on file Attends Congregational service: Not on file Active member of club or organization: Not on file Attends meetings of clubs or organizations: Not on file Relationship status: Not on file  Intimate partner violence Fear of current or ex partner: Not on file Emotionally abused: Not on file Physically abused: Not on file Forced sexual activity: Not on file Other Topics Concern  Not on file Social History Narrative  Not on file ALLERGIES: Aggrenox [aspirin-dipyridamole]; Codeine; and Pcn [penicillins] Review of Systems Unable to perform ROS: Acuity of condition Vitals:  
 05/15/20 1843 BP: 147/79 Pulse: (!) 125 Resp: 26 Temp: 100.2 °F (37.9 °C) SpO2: 93% Weight: 45.4 kg (100 lb) Height: 5' 3\" (1.6 m) Physical Exam 
Vitals signs and nursing note reviewed. CONSTITUTIONAL: Frail elderly female; in no apparent distress HEAD: Normocephalic; atraumatic EYES: PERRL; EOM intact; conjunctiva and sclera are clear bilaterally. ENT: No rhinorrhea; normal pharynx with no tonsillar hypertrophy; mucous membranes pink/moist, no erythema, no exudate. NECK: Supple; non-tender; no cervical lymphadenopathy CARD: Normal S1, S2; no murmurs, rubs, or gallops. Increase irregularly irregular rate and rhythm. RESP: Normal respiratory effort; breath sounds clear and equal bilaterally; no wheezes, rhonchi, or rales. ABD: Normal bowel sounds; non-distended; non-tender; no palpable organomegaly, no masses, no bruits. Back Exam: Normal inspection; no vertebral point tenderness, no CVA tenderness. Normal range of motion. EXT: Normal ROM in all four extremities; non-tender to palpation; no swelling or deformity; distal pulses are normal, no edema. SKIN: Warm; dry; no rash. NEURO:Alert and oriented x self, coherent, ANKITA-XII grossly intact, chronic left-sided deficit with weakness and paresthesia MDM Number of Diagnoses or Management Options Chronic atrial fibrillation with rapid ventricular response ([address]): Fecal impaction ([address]):  
 Pleural effusion on right:  
Severe sepsis [facility]):  
Diagnosis management comments: Assessment: 80-year-old female who presents to the ED with altered mental status with fever, shortness of breath and decreased p.o. intake suspect sepsis rule out pneumonia/COVID-19 as the patient is high risk. Patient is DNR. Plan: Lab/IV fluid/EKG/chest x-ray/antipyretic/broad-spectrum antibiotics/consult hospitalist/ Monitor and Reevaluate. Amount and/or Complexity of Data Reviewed Clinical lab tests: ordered and reviewed Tests in the radiology section of CPT®: ordered and reviewed Tests in the medicine section of CPT®: reviewed and ordered Discussion of test results with the performing providers: yes Decide to obtain previous medical records or to obtain history from someone other than the patient: yes Obtain history from someone other than the patient: yes Review and summarize past medical records: yes Discuss the patient with other providers: yes Independent visualization of images, tracings, or specimens: yes Risk of Complications, Morbidity, and/or Mortality Presenting problems: moderate Diagnostic procedures: moderate Management options: moderate Critical Care Total time providing critical care: (Total critical care time spent exclusive of procedures: 45 minutes) Patient Progress Patient progress: stable Procedures ED EKG interpretation: 
Rhythm: atrial fib; and irregular with rapid ventricular response. Rate (approx.): 142; Axis: normal; QRS interval: normal ; ST/T wave: non-specific changes; in  Lead: Diffusely; Other findings: abnormal ekg. This EKG was interpreted by Devi Tolentino MD,ED Provider. XRAY INTERPRETATION (ED MD) Chest Xray Right pleural effusion rule out pneumonia. Normal heart size. No bony abnormalities. No infiltrate.  
Toyin Bacon MD 8:19 PM 
 
PROGRESS NOTE: 
Pt has been reexamined by Toyin Bacon MD all available results have been reviewed with pt and any available family. Pt understands sx, dx, and tx in ED. Care plan has been outlined and questions have been answered. Pt and any available family understands and agrees to need for admission to hospital for further tx not available in ED. Pt is ready for admission. Written by Shane Potter MD,  9:35 PM 
 
Hospitalist Perfect Serve for Admission 9:36 PM 
 
ED Room Number: SU49/75 Patient Name and age:  Laurel Hernandez 80 y.o.  female Working Diagnosis: 1. Severe sepsis ([address] 2. Chronic atrial fibrillation with rapid ventricular response (HCC) 3. Pleural effusion on right COVID-19 Suspicion:  yes Code Status:  Do Not Resuscitate Readmission: no 
Isolation Requirements:  yes Recommended Level of Care:  telemetry Department:[facility] - [phone] Other: The patient is DNR with her daughter is POA. CONSULT NOTE: 
Jennifer Goodwin MD spoke with Dr. Alma Shaikh of the adult hospitalist team. Discussed patient's presentation, history, physical assessment, and available diagnostic results. He will evaluate, write orders and admit the patient to the hospital. 9:37 PM 
 
.

## 2020-05-16 NOTE — PROGRESS NOTES
[address] Dosing Services: Antimicrobial Stewardship Progress Note Consult for antibiotic dosing of Vancomycin and renal adjustment of Cefepime by Dr. Juvencio Sr Pharmacist reviewed antibiotic appropriateness for 80year old , female  for indication of sepsis, UTI Day of Therapy 1 Plan: 
Vancomycin therapy: 
Start Vancomycin therapy, with loading dose of 1.25 grams x 1 Follow with maintenance dose of 750 mg IV every 24 hours Dose calculated to approximate a therapeutic trough of 15-20 mcg/mL. Last trough level / Plan for level: after 2 doses Pharmacy to follow daily and will make changes to dose and/or frequency based on clinical status. Non-Kinetic Antimicrobial Dosing: Cefepime 2 grams every 12 hours for CrCl~30 Serum Creatinine Lab Results Component Value Date/Time Creatinine 0.78 05/15/2020 08:26 PM  
   
Creatinine Clearance Estimated Creatinine Clearance: 30.9 mL/min (based on SCr of 0.78 mg/dL). WBC Lab Results Component Value Date/Time WBC 14.5 (H) 05/15/2020 08:26 PM  
   
 
 
Pharmacist: Wilbur Guillen,[address] information:

## 2020-05-16 NOTE — PROGRESS NOTES
Admission Medication Reconciliation: 
  
Information obtained from:  
Daughter via phone call to room ER 11 RxQuery data available¹:  YES Comments/Recommendations:  
Updated PTA medication list 
Declines to list a preferred pharmacy at this time Reviewed patient's allergies ¹RxQuery pharmacy benefit data reflects medications filled and processed through the patient's insurance, however  
this data does NOT capture whether the medication was picked up or is currently being taken by the patient. Prior to Admission Medications Prescriptions Last Dose Informant Taking? apixaban (ELIQUIS) 2.5 mg tablet 5/15/2020 at 7am Self Yes Sig: Take 2.5 mg by mouth two (2) times a day. Indications: treatment to prevent blood clots in chronic atrial fibrillation  
magnesium oxide 250 mg magnesium tablet  Self Yes Sig: Take 250 mg by mouth daily as needed (constipation). metoprolol (LOPRESSOR) 25 mg tablet 5/15/2020 at am Self Yes Sig: Take 12.5 mg by mouth daily. metoprolol tartrate (LOPRESSOR) 25 mg tablet 5/14/2020 at Unknown time Self Yes Sig: Take 25 mg by mouth nightly. simvastatin (ZOCOR) 10 mg tablet 5/14/2020 at Unknown time Self Yes Sig: Take 10 mg by mouth nightly. Facility-Administered Medications: None Please contact the main inpatient pharmacy with any questions or concerns at [phone] and we will direct you to the clinical pharmacist covering this patient's care while in-house.   
Yoshi Keene, MegaD, BCPS

## 2020-05-16 NOTE — PROGRESS NOTES
Verbal shift change report given to [address] (oncoming nurse) by Joie Dunn (offgoing nurse). Report included the following information SBAR, Kardex and Intake/Output.

## 2020-05-17 PROBLEM — E86.0 DEHYDRATION: Status: RESOLVED | Noted: 2020-01-01 | Resolved: 2020-01-01

## 2020-05-17 NOTE — PROGRESS NOTES
3827-Chart accessed for review due to MEWS 5. Observed RN has addressed this with attending physician and medicated patient with good result. Pt with low O2 requirement and appears otherwise stable. Will continue to follow.  Jerardo GOLDBERG

## 2020-05-17 NOTE — PROGRESS NOTES
I went to PT room after I restive a call from RN asked for help from RT because PT SOB. I nasal suction PT because there was audible wet sound, after she was eating by RN. Spo2 92% on 6 l/m, and started to drop to 88% after suctioning. I changed  o2 device to NRM 15 l/m, Spo2 96% now, and PT back to NC 6 l/m spo2 is 95%.

## 2020-05-17 NOTE — PROGRESS NOTES
Patient's BP is up at 174/86 (taken manually), RR in the 30s, HR currently under the 100s. Mews is up at 5. Dr. Melissa Hernandez notified of noon vitals, ordered to give the Labetalol. BP taken, 112/77 and HR 89.

## 2020-05-17 NOTE — PROGRESS NOTES
Problem: Falls - Risk of 
Goal: *Absence of Falls Description: Document Alisha Jolley Fall Risk and appropriate interventions in the flowsheet. Outcome: Progressing Towards Goal 
Note: Fall Risk Interventions: 
Mobility Interventions: Bed/chair exit alarm Mentation Interventions: More frequent rounding Medication Interventions: Patient to call before getting OOB Elimination Interventions: Toileting schedule/hourly rounds Problem: Patient Education: Go to Patient Education Activity Goal: Patient/Family Education Outcome: Progressing Towards Goal 
  
Problem: Pressure Injury - Risk of 
Goal: *Prevention of pressure injury Description: Document Kan Scale and appropriate interventions in the flowsheet. Outcome: Progressing Towards Goal 
Note: Pressure Injury Interventions: 
Sensory Interventions: Assess need for specialty bed Moisture Interventions: Absorbent underpads Activity Interventions: PT/OT evaluation Mobility Interventions: PT/OT evaluation Nutrition Interventions: Offer support with meals,snacks and hydration Friction and Shear Interventions: Minimize layers Problem: Patient Education: Go to Patient Education Activity Goal: Patient/Family Education Outcome: Progressing Towards Goal

## 2020-05-17 NOTE — PROGRESS NOTES
Problem: Falls - Risk of 
Goal: *Absence of Falls Description: Document Flo Glynn Fall Risk and appropriate interventions in the flowsheet. Outcome: Progressing Towards Goal 
Note: Fall Risk Interventions: 
Mobility Interventions: Bed/chair exit alarm, Patient to call before getting OOB Mentation Interventions: Bed/chair exit alarm, More frequent rounding, Reorient patient Medication Interventions: Patient to call before getting OOB, Teach patient to arise slowly, Utilize gait belt for transfers/ambulation Elimination Interventions: Patient to call for help with toileting needs, Call light in reach, Bed/chair exit alarm Patient resting peacefully in bed with bed alarm in place.

## 2020-05-17 NOTE — PROGRESS NOTES
Patient sitting up in bed to eat dinner on 1.5L oxygen satting above 90%. No issues swallowing for breakfast or lunch, and tolerated icee during dinner. Upon drinking soup, patient developed gurgling cough and began oxygen satting below the 80s. Charge nurse, respiratory therapist and rapid response called. Patient was suctioned through nose and mouth by respiratory therapist and put on nonrebreather. See RT note. Patient's gurgling cough mostly resolved and oxygen saturation started to come back up. Able to wean back down to 4L NC satting above 90s. Patient on continuous pulse ox. Stat CXR done and patient put on NPO per RRT nurse. Verbal shift change report given to [address] (oncoming nurse) by Shira Grimaldo (offgoing nurse). Report included the following information SBAR, Kardex, Intake/Output and Recent Results. Informed Pulmonary team (Dr Cristian Shin) of incident. Temple Osgood RN informed Dr Leia Powers of incident.

## 2020-05-17 NOTE — PROGRESS NOTES
Brian Stiles oswald [place] [address] 
[address] [place] [address], [address] [place] [address] 
[phone] Medical Progress Note NAME: Lauren Rajput :  3/20/1925 MRM:  [id number] Date/Time: 2020  8:25 AM  
 
  
Subjective: Chief Complaint:  Sepsis: patient is aphasic at baseline and cannot answer my questions ROS: 
(bold if positive, if negative) Unable to obtain due to aphasia Objective:  
 
 
Vitals:  
 
 
  
Last 24hrs VS reviewed since prior progress note. Most recent are: 
 
Visit Vitals BP (!) 135/95 (BP 1 Location: Left arm, BP Patient Position: At rest) Pulse 84 Temp 98.8 °F (37.1 °C) Resp 22 Ht 5' 3\" (1.6 m) Wt 45.4 kg (100 lb) SpO2 94% BMI 17.71 kg/m² SpO2 Readings from Last 6 Encounters:  
20 94% 03/15/16 99%  
07/10/15 95% O2 Flow Rate (L/min): 1 l/min No intake or output data in the 24 hours ending 20 0825 Exam:  
 
Physical Exam: 
 
Gen:  Well-developed, thin, frail, elderly, chronically ill-appearing, in no acute distress HEENT:  Pink conjunctivae, PERRL, hearing intact to voice, moist mucous membranes Neck:  Supple, without masses, thyroid non-tender Resp:  No accessory muscle use, clear breath sounds without wheezes rales or rhonchi 
Card:  No murmurs, normal S1, S2 without thrills, bruits or peripheral edema, 2+ pedal pulses Abd:  Soft, non-tender, non-distended, normoactive bowel sounds are present, no palpable organomegaly and no detectable hernias Lymph:  No cervical or inguinal adenopathy Musc:  No cyanosis or clubbing Skin:  No rashes or ulcers, skin turgor is good, cap refill <2 sec Neuro:  Aphasic, left hemiparesis, does not follow commands appropriately Psych:  No insight, not oriented to person, place and time, lethargic Medications Reviewed: (see below) Lab Data Reviewed: (see below) 
 
______________________________________________________________________ Medications: Current Facility-Administered Medications Medication Dose Route Frequency  sodium phosphate 30 mmol in 0.9% sodium chloride 250 mL infusion   IntraVENous ONCE  polyethylene glycol (MIRALAX) packet 17 g  17 g Oral QHS  metroNIDAZOLE (FLAGYL) IVPB premix 500 mg  500 mg IntraVENous Q12H  
 metoprolol tartrate (LOPRESSOR) tablet 12.5 mg  12.5 mg Oral DAILY  metoprolol tartrate (LOPRESSOR) tablet 25 mg  25 mg Oral QHS  dextrose 5% - 0.45% NaCl with KCl 20 mEq/L infusion  150 mL/hr IntraVENous CONTINUOUS  
 acetaminophen (TYLENOL) tablet 650 mg  650 mg Oral Q4H PRN  
 diphenhydrAMINE (BENADRYL) capsule 25 mg  25 mg Oral Q4H PRN  
 ondansetron (ZOFRAN) injection 4 mg  4 mg IntraVENous Q4H PRN  
 labetaloL (NORMODYNE;TRANDATE) 20 mg/4 mL (5 mg/mL) injection 20 mg  20 mg IntraVENous Q6H PRN  
 senna-docusate (PERICOLACE) 8.6-50 mg per tablet 1 Tab  1 Tab Oral DAILY  cefepime (MAXIPIME) 2 g in 0.9% sodium chloride (MBP/ADV) 100 mL  2 g IntraVENous Q12H  
 vancomycin (VANCOCIN) 750 mg in 0.9% sodium chloride (MBP/ADV) 250 mL  750 mg IntraVENous Q24H Lab Review:  
 
Recent Labs 05/17/20 
9618 05/16/20 
0136 05/15/20 
2026 WBC 12.9* 12.0* 14.5* HGB 13.2 11.9 14.8 HCT 41.2 36.9 45.8  154 199 Recent Labs 05/17/20 
8451 05/16/20 
0136 05/15/20 
2026  145 140  
K 4.8 4.0 4.1 * 115* 109* CO2 26 24 27 * 180* 142* BUN 18 26* 27* CREA 0.52* 0.68 0.78  
CA 8.8 8.0* 9.4 MG 2.4 1.5* 1.9 PHOS 1.7* 1.9* 1.7* ALB  --  2.1* 3.0* TBILI  --  0.8 1.2* SGOT  --  20 23 ALT  --  22 25 Lab Results Component Value Date/Time Glucose (POC) 94 07/10/2015 11:38 AM  
 
No results for input(s): PH, PCO2, PO2, HCO3, FIO2 in the last 72 hours. No results for input(s): INR, INREXT, INREXT in the last 72 hours. No results found for: SDES Lab Results Component Value Date/Time Culture result: CULTURE IN PROGRESS,FURTHER UPDATES TO FOLLOW 05/15/2020 08:40 PM  
 Culture result: NO GROWTH 2 DAYS 05/15/2020 08:24 PM  
 Culture result:  03/15/2016 03:15 AM  
  [address] COLONIES/mL MIXED GRAM POSITIVE MAGALI, PROBABLE SKIN/GENITAL CONTAMINATION. Assessment:  
 
Principal Problem: 
  UTI (urinary tract infection) (5/15/2020) Active Problems: 
  Atrial fibrillation with RVR (HCC) () Hx of completed stroke () Dehydration (5/15/2020) FTT (failure to thrive) in adult (5/15/2020) Sepsis ([address] (5/15/2020) Pleural effusion on right (5/15/2020) Chronic anticoagulation (5/15/2020) Fecal impaction ([address] (5/15/2020) Plan:  
 
Principal Problem: 
  UTI (urinary tract infection) (5/15/2020) - continue antibiotics as above pending urine culture Active Problems: 
  Atrial fibrillation with RVR (HCC) ()/Hx of completed stroke ()/Chronic anticoagulation (5/15/2020) - rate controlled after hydration mainly, had increased rate yesterday with activity in bed  
 - anticoagulation on hold due to probable procedure Dehydration (5/15/2020) - resolved on IV fluids FTT (failure to thrive) in adult (5/15/2020) - suspect this is due to end stage disease which is not correctable but there is also likely an acute component of decompensation related to UTI 
 - Palliative Care to see Sepsis ([address] (5/15/2020) 
 - UTI is likely source, on broad spectrum antibiotics for now 
 - negative COVID-19 test, low suspicion, no need to retest 
 
  Pleural effusion on right (5/15/2020) - Pulmonary input appreciated, will decide on Monday whether/when to tap Fecal impaction ([address] (5/15/2020) 
 - enema produced results, continue bowel agents as ordered Total time spent in patient care: 25 minutes Care Plan discussed with: Patient, Care Manager and Nursing Staff Discussed:  Code Status, Care Plan and D/C Planning Prophylaxis:  not needed Disposition:  TBD, favor hospice of some sort at discharge 
        
___________________________________________________ Attending Physician: Ashleigh Jamil MD

## 2020-05-18 PROBLEM — I50.84 END STAGE HEART FAILURE (HCC): Status: ACTIVE | Noted: 2020-01-01

## 2020-05-18 NOTE — PROGRESS NOTES
visited Mrs. Thelma Brown for a follow up visit on the Med. Surgical unit. Mrs. Thelma Brown was lying in bed and appeared to be resting comfortably. Collaborated with Hospice RN and Mrs. Ameena Wall RN, who informed  that Mrs. Thelma Brown will be transitioning to comfort later today. Family should be coming to the hospital this afternoon. Provided supportive presence and words of comfort at bedside. Chaplains will follow up as able and/or needed Starla Padilla. Tosin Vaughn.  Paging Service: [phone])

## 2020-05-18 NOTE — PROGRESS NOTES
[facility] Pulmonary, Critical Care, and Sleep Medicine Initial Patient Consult Name: Natalia Rainey MRN: [id number] : 3/20/1925 Hospital: [address] [place] [address] Date: 2020 IMPRESSION:  
· Acute hypoxemic respiratory failure · Afib w/ RVR 
· UTI · R pleural effusion · H/o CVA w/ L sided deficits and aphasia · H/o dementia RECOMMENDATIONS:  
· Supplemental O2 as needed to keep sats > 90% · Continue abx · Follow cultures · F/U MRSA screen · Replete Mag and Phos · Would hold off on thora; I do not think it could be safely done due to her underlying dementia and it would not add much benefit DNR/DNI Note plans for hospice on discharge Subjective:  
 
Asked to see Ms. Colette Santiago by Dr. Lily Aburto for evaluation of pleural effusion. Ms. Colette Santiago is a 79yo female w/ history of CVA w/ L sided deficits and aphasia, dementia and afib who presented to the ER on 5/15 for evaluation of shortness of breath, lethargy and fever (Tm 101.7). Unable to obtain any further history from the patient due to dementia and aphasia. 5/15 - CT chest: R pleural effusion 
        - CT abd/pelvis: fecal impaction *RVP negative *Covid-19 negative Interval History: Afebrile BP elevated O2 sats 93% on 4L NC 
WBC 16.5; increased Phos 1.5 Mag 1.8 INR 1.2 TSH .98 Urine culture with possible alpha strep >100,000 colonies Patient unable to provide ROS. Lethargic and does not track. Not following any commands. RN notes that she groans with turning and will grab them. ECHO:  EF 55-60%, no regional wall motion abnormality noted, moderately dilated LA, moderately dilated RA, trace MVR, mild TR, moderate to severe pulmonary hypertension: PASP 66mmHg Past Medical History:  
Diagnosis Date  Aphasia  Atrial fibrillation ([address]  Hemiparesis affecting left side as late effect of cerebrovascular accident (CVA) ([address]  Hx of completed stroke No past surgical history on file. Prior to Admission medications Medication Sig Start Date End Date Taking? Authorizing Provider  
metoprolol tartrate (LOPRESSOR) 25 mg tablet Take 25 mg by mouth nightly. Yes Provider, Negra  
apixaban (ELIQUIS) 2.5 mg tablet Take 2.5 mg by mouth two (2) times a day. Indications: treatment to prevent blood clots in chronic atrial fibrillation   Yes Other, MD Shashank  
magnesium oxide 250 mg magnesium tablet Take 250 mg by mouth daily as needed (constipation). Yes Other, MD Shashank  
metoprolol (LOPRESSOR) 25 mg tablet Take 12.5 mg by mouth daily. Yes Other, MD Shashank  
simvastatin (ZOCOR) 10 mg tablet Take 10 mg by mouth nightly. Yes Other, MD Shashank  
 
Allergies Allergen Reactions  Aggrenox [Aspirin-Dipyridamole] Nausea and Vomiting  Codeine Other (comments)  
  unknown  Pcn [Penicillins] Other (comments)  
  unknown Social History Tobacco Use  Smoking status: Never Smoker Substance Use Topics  Alcohol use: No  
  
No family history on file. Current Facility-Administered Medications Medication Dose Route Frequency  vancomycin (VANCOCIN) 750 mg in 0.9% sodium chloride (MBP/ADV) 250 mL  750 mg IntraVENous Q12H  polyethylene glycol (MIRALAX) packet 17 g  17 g Oral QHS  metroNIDAZOLE (FLAGYL) IVPB premix 500 mg  500 mg IntraVENous Q12H  
 metoprolol tartrate (LOPRESSOR) tablet 12.5 mg  12.5 mg Oral DAILY  metoprolol tartrate (LOPRESSOR) tablet 25 mg  25 mg Oral QHS  dextrose 5% - 0.45% NaCl with KCl 20 mEq/L infusion  150 mL/hr IntraVENous CONTINUOUS  
 senna-docusate (PERICOLACE) 8.6-50 mg per tablet 1 Tab  1 Tab Oral DAILY  cefepime (MAXIPIME) 2 g in 0.9% sodium chloride (MBP/ADV) 100 mL  2 g IntraVENous Q12H Review of Systems: 
Review of systems not obtained due to patient factors. Objective:  
Vital Signs:   
Visit Vitals /69 (BP 1 Location: Left arm, BP Patient Position: At rest) Pulse 86  
 Temp 96.5 °F (35.8 °C) Resp 18 Ht 5' 3\" (1.6 m) Wt 45.4 kg (100 lb 1.4 oz) SpO2 93% BMI 17.73 kg/m² O2 Device: Nasal cannula O2 Flow Rate (L/min): 4 l/min Temp (24hrs), Av °F (36.1 °C), Min:96.2 °F (35.7 °C), Max:97.9 °F (36.6 °C) Intake/Output:  
Last shift:      No intake/output data recorded. Last 3 shifts:  1901 -  0700 In: 250 [P.O.:250] Out: 410 [Urine:410] Intake/Output Summary (Last 24 hours) at 2020 0935 Last data filed at 2020 2719 Gross per 24 hour Intake 100 ml Output 410 ml Net -310 ml Physical Exam:  
General:  Lethargic, slightly tachypnic Head:  NCAT Eyes:    
Nose: Throat:   
Neck:   
Back:     
Lungs:   Diminished throughout Chest wall:    
Heart:  irregular Abdomen:   soft Extremities: No edema Pulses:   
Skin:   
Lymph nodes:   
Neurologic: Aphasic, does not track Exam limited to avoid unnecessary exposure and to preserve PPE during [address] pandemic. Data review:  
 
Recent Results (from the past 24 hour(s)) ECHO ADULT COMPLETE Collection Time: 20  9:44 AM  
Result Value Ref Range LA Volume 39.9 22 - 52 mL Ao Root D 3.64 cm Aortic Valve Systolic Peak Velocity 265.46 cm/s Aortic Valve Area by Continuity of Peak Velocity 2.6 cm2 AoV PG 5.4 mmHg LVIDd 4.17 3.9 - 5.3 cm  
 LVPWd 1.16 (A) 0.6 - 0.9 cm LVIDs 2.59 cm IVSd 0.95 (A) 0.6 - 0.9 cm  
 LVOT d 1.96 cm  
 LVOT Peak Velocity 99.39 cm/s LVOT Peak Gradient 4.0 mmHg RVIDd 4.43 cm  
 LA Vol 4C 30.92 22 - 52 mL  
 LA Vol 2C 47.86 22 - 52 mL  
 LA Area 4C 13.2 cm2 LV Mass .6 (A) 67 - 162 g  
 LV Mass AL Index 117.0 (A) 43 - 95 g/m2 RVSP 65.7 mmHg Est. RA Pressure 8.0 mmHg Triscuspid Valve Regurgitation Peak Gradient 57.7 mmHg Pulmonic Valve Max Velocity 77.84 cm/s  
 TR Max Velocity 379.97 cm/s  
 LA Vol Index 27.69 16 - 28 ml/m2 PASP 65.7 mmHg LA Vol Index 33.21 16 - 28 ml/m2 LA Vol Index 21.46 16 - 28 ml/m2 LEBRON/BSA Pk Matthew 1.8 cm2/m2 Left Ventricular Fractional Shortening by 2D 08.747819797 % AV Velocity Ratio 0.86 Left Ventricular End Diastolic Volume by Teichholz Method 66.97538426 mL Left Ventricular End Systolic Volume by Teichholz Method 20.988063041 mL Left Ventricular Stroke Volume by Teichholz Method 00.452035628 mL  
 PV peak gradient 2.4 mmHg Angela aMrques Collection Time: 05/17/20  8:25 PM  
Result Value Ref Range Vancomycin,trough 3.1 (L) 5.0 - 10.0 ug/mL Reported dose date: [id number] Reported dose time: 2200 Reported dose: 750 MG UNITS PROTHROMBIN TIME + INR Collection Time: 05/18/20  4:10 AM  
Result Value Ref Range INR 1.2 (H) 0.9 - 1.1 Prothrombin time 12.3 (H) 9.0 - 11.1 sec CBC W/O DIFF Collection Time: 05/18/20  4:10 AM  
Result Value Ref Range WBC 16.5 (H) 3.6 - 11.0 K/uL  
 RBC 4.62 3.80 - 5.20 M/uL  
 HGB 14.4 11.5 - 16.0 g/dL HCT 47.3 (H) 35.0 - 47.0 % .4 (H) 80.0 - 99.0 FL  
 MCH 31.2 26.0 - 34.0 PG  
 MCHC 30.4 30.0 - 36.5 g/dL  
 RDW 13.2 11.5 - 14.5 % PLATELET 577 917 - 352 K/uL MPV 9.6 8.9 - 12.9 FL  
 NRBC 0.0 0  WBC ABSOLUTE NRBC 0.00 0.00 - 0.01 K/uL METABOLIC PANEL, BASIC Collection Time: 05/18/20  4:10 AM  
Result Value Ref Range Sodium 138 136 - 145 mmol/L Potassium 4.8 3.5 - 5.1 mmol/L Chloride 109 (H) 97 - 108 mmol/L  
 CO2 25 21 - 32 mmol/L Anion gap 4 (L) 5 - 15 mmol/L Glucose 200 (H) 65 - 100 mg/dL BUN 15 6 - 20 MG/DL Creatinine 0.42 (L) 0.55 - 1.02 MG/DL  
 BUN/Creatinine ratio 36 (H) 12 - 20 GFR est AA >60 >60 ml/min/1.73m2 GFR est non-AA >60 >60 ml/min/1.73m2 Calcium 8.5 8.5 - 10.1 MG/DL  
PHOSPHORUS Collection Time: 05/18/20  4:10 AM  
Result Value Ref Range Phosphorus 1.5 (L) 2.6 - 4.7 MG/DL MAGNESIUM Collection Time: 05/18/20  4:10 AM  
Result Value Ref Range Magnesium 1.8 1.6 - 2.4 mg/dL Imaging: 
I have personally reviewed the patients radiographs and have reviewed the reports: 
  
 
  
Leanne Polo NP

## 2020-05-18 NOTE — PROGRESS NOTES
Problem: Hospice Orientation Goal: Demonstrate understanding of hospice philosophy, plan of care, and home hospice program 
Description: The patient/family/caregiver will demonstrate understanding of hospice philosophy, plan of care and the home hospice program as evidenced by participation in meeting the patient's psychosocial, spiritual, medical, and physical needs inclusive of medical supplies/equipment focusing on symptoms. Outcome: Progressing Towards Goal 
  
Problem: Potential for Skin Breakdown Goal: Demonstrate ability to care for skin, monitor areas of breakdown and demonstrate methods to prevent breakdown Description: Patient/family/caregiver will demonstrate ability to care for patient's skin, monitor for areas of breakdown, and demonstrate methods to prevent breakdown during hospice care. Outcome: Progressing Towards Goal 
  
Problem: Pain Goal: Verbalize satisfaction of level of comfort and symptom control Outcome: Progressing Towards Goal 
  
Problem: Anxiety/Agitation Goal: Verbalize and demonstrate ability to manage anxiety Description: The patient/family/caregiver will verbalize and demonstrate ability to manage the patient's anxiety throughout hospice care. Outcome: Progressing Towards Goal 
  
Problem: End of Life Process Goal: Demonstrate understanding of end of life processes Description: Patient/caregiver will understand end of life processes.  
Outcome: Progressing Towards Goal

## 2020-05-18 NOTE — H&P
[facility] [facility] Those [facility] 
[phone] Patient Name: Israel Pinedo YOB: 1925 Date of Provider Hospice Visit: 05/18/20 Level of Care:   [x] General Inpatient (GIP)    [] Routine   [] Respite Current Location of Care: 
[] [facility] [x] [facility] [] [facility] [] [facility] [] H[facility], patient referred from: 
[] [facility] [] [facility] [] [facility] [] [facility] [] Home [] Other:  
 
Date of Original Hospice Admission: 5/18/20 Hospice Medical Director at time of admission: Guillermo Petty Principle Hospice Diagnosis: End-stage cardiac disease Diagnoses RELATED to the terminal prognosis: Advanced dementia, sepsis, urinary tract infection, right hemithorax opacification, acute respiratory failure Other Diagnoses: Ashu Pinedo is a 80y.o. year old who was admitted to [facility]. Patient is a 61-year-old female mated to the hospital at [facility] on 5/15/2020 with dyspnea. Apparently dyspnea associated days of anorexia lethargy and fevers. Did appear to meet sepsis criteria at the time of admission. CT scanning showed a large right pleural effusion and a urinary tract infection. Patient was admitted to the hospital for further work-up and management. Patient with an echo that shows severe pulmonary hypertension with right ventricular heart failure. Patient initially arranged to go home with hospice but then had a significant decline over the last 24 hours with chest x-ray in the right showing complete opacification of the right hemithorax. Given this rapid decline, patient was admitted to inpatient hospice for management of her shortness of breath and restlessness. The patient's principle diagnosis has resulted in respiratory failure Refer to LCD Functionally, the patient's Karnofsky and/or Palliative Performance Scale has declined over a period of days to weeks and is estimated at 10.  The patient is dependent on the following ADLs: All Objective information that support this patients limited prognosis includes:  
Chest x-ray with complete opacification of the right hemithorax The patient/family chose comfort measures with the support of Hospice. HOSPICE DIAGNOSES Active Symptoms: 1. Acute diastolic heart failure 2. Shortness of breath 3. Restlessness with agitation 4. Complete opacification of the right hemithorax 5. End-of-life care PLAN 1. Patient admitted to [facility] care secondary to the need of frequent nursing assessment and medication management 2. Start morphine 2 mg IV every 4 hours scheduled and every 2 hours as needed 3. Comfort order set placed for all other symptoms 4. Patient discussed with bedside nurse, hospice liaisonAmina, and patient's daughter at the bedside 5.  and SW to support family needs 6. Disposition: Likely will pass in the hospital 
7. Hospice Plan of care was reviewed in detail and agree with current plan of care Prognosis estimated based on 05/18/20 clinical assessment is:  
[x] Hours to Days   
[] Days to Weeks   
[] Other: 
 
Communicated plan of care with: Hospice Case Manager; Hospice IDT; Care Team 
 
 GOALS OF CARE Patient/Medical POA stated Goal of Care: Comfort care with the support of hospice 
 
[x] I have reviewed and/or updated ACP information in the Advance Care Planning Navigator. This information is available in the [address] link in the patient's chart header. Primary Decision [address] ([address]):   Primary Decision Maker: Francisco Monisha Child - [phone] Resuscitation Status: DNR If DNR is there a Durable DNR on file? : [x] Yes [] No (If no, complete Durable DNR) HISTORY History obtained from: Chart, daughter CHIEF COMPLAINT: Confusion The patient is:  
[] Verbal 
[] Nonverbal 
[x] Unresponsive HPI/SUBJECTIVE: Patient appears minimally responsive but with agonal type breathing. Also shows some labored breathing with increase use of accessory muscle REVIEW OF SYSTEMS The following systems were: [] reviewed  [x] unable to be reviewed Positive ROS include: 
Constitutional: fatigue, weakness, in pain, short of breath Ears/nose/mouth/throat: increased airway secretions Respiratory:shortness of breath, wheezing Gastrointestinal:poor appetite, nausea, vomiting, abdominal pain, constipation, diarrhea Musculoskeletal:pain, deformities, swelling legs Neurologic:confusion, hallucinations, weakness Psychiatric:anxiety, feeling depressed, poor sleep Endocrine:  
 
Adult Non-Verbal Pain Assessment Score: 6 Face 
[] 0   No particular expression or smile 
[] 1   Occasional grimace, tearing, frowning, wrinkled forehead 
[x] 2   Frequent grimace, tearing, frowning, wrinkled forehead Activity (movement) [] 0   Lying quietly, normal position 
[] 1   Seeking attention through movement or slow, cautious movement 
[x] 2   Restless, excessive activity and/or withdrawal reflexes Guarding 
[] 0   Lying quietly, no positioning of hands over areas of body [x] 1   Splinting areas of the body, tense 
[] 2   Rigid, stiff Physiology (vital signs) [x] 0   Stable vital signs [] 1   Change in any of the following: SBP > 20mm Hg; HR > 20/minute 
[] 2   Change in any of the following: SBP > 30mm Hg; HR > 25/minute Respiratory 
[] 0   Baseline RR/SpO2, compliant with ventilator 
[x] 1   RR > 10 above baseline, or 5% drop SpO2, mild asynchrony with ventilator 
[] 2   RR > 20 above baseline, or 10% drop SpO2, asynchrony with ventilator FUNCTIONAL ASSESSMENT Palliative Performance Scale (PPS): 10 PSYCHOSOCIAL/SPIRITUAL ASSESSMENT Active Problems: 
  End stage heart failure ([facility] (5/18/2020) Past Medical History:  
Diagnosis Date  Aphasia  Atrial fibrillation ([facility]  Hemiparesis affecting left side as late effect of cerebrovascular accident (CVA) ([facility]  Hx of completed stroke No past surgical history on file. Social History Tobacco Use  Smoking status: Never Smoker Substance Use Topics  Alcohol use: No  
 
No family history on file. Allergies Allergen Reactions  Aggrenox [Aspirin-Dipyridamole] Nausea and Vomiting  Codeine Other (comments)  
  unknown  Pcn [Penicillins] Other (comments)  
  unknown Current Facility-Administered Medications Medication Dose Route Frequency  ondansetron (ZOFRAN) injection 4 mg  4 mg IntraVENous Q4H PRN  
 LORazepam (ATIVAN) injection 1 mg  1 mg IntraVENous Q15MIN PRN  
 ketorolac (TORADOL) injection 30 mg  30 mg IntraVENous Q8H PRN  
 scopolamine (TRANSDERM-SCOP) 1 mg over 3 days 1 Patch  1 Patch TransDERmal Q72H  
 glycopyrrolate (ROBINUL) injection 0.2 mg  0.2 mg IntraVENous Q4H PRN  
 bisacodyL (DULCOLAX) suppository 10 mg  10 mg Rectal DAILY PRN  
 morphine injection 2 mg  2 mg IntraVENous Q15MIN PRN  
 morphine injection 2 mg  2 mg IntraVENous Q4H PHYSICAL EXAM  
 
Wt Readings from Last 3 Encounters:  
05/17/20 100 lb 1.4 oz (45.4 kg) 03/15/16 126 lb (57.2 kg) 07/10/15 129 lb (58.5 kg) There were no vitals taken for this visit. Supplemental O2  [x] Yes  [] NO Last bowel movement:  
 
Currently this patient has: 
[x] Peripheral IV [] PICC  [] PORT [] ICD [x] [place] Catheter [] NG Tube   [] PEG Tube   
[] Rectal Tube [] Drain 
[] Other:  
 
Constitutional: Ill-appearing, ashen, unresponsive Eyes: Reactive ENMT: Dry 
Cardiovascular: Tachycardia Respiratory: Increased work of breathing, decreased on the right, accessory muscle use noted Gastrointestinal: Soft Musculoskeletal: Muscle wasting Skin: Unremarkable Neurologic: Minimally responsive Psychiatric: Restlessness Other:  
 
 
Pertinent Lab and or Imaging Tests: 
Lab Results Component Value Date/Time Sodium 138 05/18/2020 04:10 AM  
 Potassium 4.8 05/18/2020 04:10 AM  
 Chloride 109 (H) 05/18/2020 04:10 AM  
 CO2 25 05/18/2020 04:10 AM  
 Anion gap 4 (L) 05/18/2020 04:10 AM  
 Glucose 200 (H) 05/18/2020 04:10 AM  
 BUN 15 05/18/2020 04:10 AM  
 Creatinine 0.42 (L) 05/18/2020 04:10 AM  
 BUN/Creatinine ratio 36 (H) 05/18/2020 04:10 AM  
 GFR est AA >60 05/18/2020 04:10 AM  
 GFR est non-AA >60 05/18/2020 04:10 AM  
 Calcium 8.5 05/18/2020 04:10 AM  
 
Lab Results Component Value Date/Time  Protein, total 5.3 (L) 05/16/2020 01:36 AM  
 Albumin 2.1 (L) 05/16/2020 01:36 AM  
 
   
 
Total time:  
Counseling / coordination time:  
> 50% counseling / coordination?:

## 2020-05-18 NOTE — DISCHARGE SUMMARY
Discharge Summary PATIENT ID: Jerod Ritter MRN: [id number] YOB: 1925 DATE OF ADMISSION: 5/15/2020  7:01 PM   
DATE OF DISCHARGE: 5/18/2020 PRIMARY CARE PROVIDER: Candelario Crawford MD  
 
ATTENDING PHYSICIAN: Liang Fischer MD 
DISCHARGING PROVIDER: Liang Fischer MD   
To contact this individual call [phone] and ask the  to page. If unavailable ask to be transferred the Adult Hospitalist Department. CONSULTATIONS: IP CONSULT TO PULMONOLOGY 
IP CONSULT TO PALLIATIVE CARE - PROVIDER PROCEDURES/SURGERIES: * No surgery found * [address] COURSE:  
79 yo female with dementia and CVA was brought to the hospital for shortness of breath. She was found to have sepsis secondary to a UTI and a large right sided pleural effusion. She was started on broad spectrum antibiotics and fluids for rehydration. She improved slightly with these treatments. Pulmonary was consulted for treatment of the pleural effusion, but was a poor candidate for thoracentesis due to underlying dementia and failure to thrive. She decompensated and became more SOB. Palliative was consulted, and she was admitted to inpatient hospice. DISCHARGE DIAGNOSES / PLAN:   
 
1.  UTI 2. Atrial fibrillation 3. Dehydration 4. Failure to thrive 5. Sepsis 6. Right pleural effusion 7. Fecal impaction ADDITIONAL CARE RECOMMENDATIONS:  
none PENDING TEST RESULTS:  
At the time of discharge the following test results are still pending: none FOLLOW UP APPOINTMENTS:   
Follow-up Information Follow up With Specialties Details Why Contact Info Candelario Crawford MD [place] [phone] 
[phone] DIET: NPO 
 
ACTIVITY: bedrest 
 
 
DISCHARGE MEDICATIONS: 
Current Discharge Medication List  
  
 
 
 
NOTIFY YOUR PHYSICIAN FOR ANY OF THE FOLLOWING:  
Fever over 101 degrees for 24 hours. Chest pain, shortness of breath, fever, chills, nausea, vomiting, diarrhea, change in mentation, falling, weakness, bleeding. Severe pain or pain not relieved by medications. Or, any other signs or symptoms that you may have questions about. DISPOSITION: 
  Home With: 
 OT  PT  HH  RN  
  
 Long term SNF/Inpatient Rehab Independent/assisted living  
x Hospice Other:  
 
 
PATIENT CONDITION AT DISCHARGE:  
 
Functional status  
x Poor Deconditioned Independent Cognition Cely Carr Forgetful   
x Dementia Catheters/lines (plus indication) [place] PICC   
 PEG   
x None Code status Full code   
x DNR PHYSICAL EXAMINATION AT DISCHARGE: 
General:          no distress, appears stated age. HEENT:           Atraumatic, anicteric sclerae, pink conjunctivae No oral ulcers, mucosa moist, throat clear, dentition fair Neck:               Supple, symmetrical 
Lungs:             course breath sounds bilaterally Chest wall:      No tenderness  No Accessory muscle use. Heart:              Regular  rhythm,  No  murmur   No edema Abdomen:        Soft, non-tender. Not distended. Bowel sounds normal 
Extremities:     No cyanosis. No clubbing,   
                        Skin turgor normal, Capillary refill normal 
Skin:                Not pale. Not Jaundiced  No rashes Psych:             Not anxious or agitated. CHRONIC MEDICAL DIAGNOSES: 
Problem List as of 5/18/2020 Date Reviewed: 5/15/2020 Codes Class Noted - Resolved Aphasia (Chronic) ICD-10-CM: R47.01 
ICD-9-CM: 255. 3  Unknown - Present Atrial fibrillation with RVR (HCC) (Chronic) ICD-10-CM: I48.91 
ICD-9-CM: 427.31  Unknown - Present Hemiparesis affecting left side as late effect of cerebrovascular accident (CVA) ([facility] [address] ICD-10-CM: F61.757 ICD-9-CM: 438.20  Unknown - Present  Hx of completed stroke (Chronic) ICD-10-CM: N80.64 
 ICD-9-CM: V12.54  Unknown - Present * (Principal) UTI (urinary tract infection) ICD-10-CM: N39.0 ICD-9-CM: 599.0  5/15/2020 - Present Back pain ICD-10-CM: M54.9 ICD-9-CM: 724.5  5/15/2020 - Present FTT (failure to thrive) in adult (Chronic) ICD-10-CM: R62.7 ICD-9-CM: 783.7  5/15/2020 - Present Sepsis ([address] ICD-10-CM: A41.9 ICD-9-CM: 038.9, 995.91  5/15/2020 - Present Pleural effusion on right ICD-10-CM: J90 ICD-9-CM: 511.9  5/15/2020 - Present Chronic anticoagulation ICD-10-CM: Z79.01 
ICD-9-CM: V58.61  5/15/2020 - Present Fecal impaction [facility]) ICD-10-CM: K56.41 
ICD-9-CM: 560.32  5/15/2020 - Present RESOLVED: Dehydration ICD-10-CM: E86.0 ICD-9-CM: 276.51  5/15/2020 - 5/17/2020 Greater than 15 minutes were spent with the patient on counseling and coordination of care Signed:  
Rogers Mary MD 
5/18/2020 
1:04 PM

## 2020-05-18 NOTE — PROGRESS NOTES
1956: Bedside shift change report given to Teddy Liu [address]  (oncoming nurse) by Yisel Baker RN (offgoing nurse). Report included the following information SBAR and Kardex.

## 2020-05-18 NOTE — PROGRESS NOTES
Palliative Medicine Consult [place]: [phone]) Patient Name: Therese Mcardle YOB: 1925 Date of Initial Consult: 5/16/20 Reason for Consult: Care decisions Requesting Provider: Baron Stone Primary Care Physician: Stanley Beach MD 
 
 SUMMARY:  
Therese Mcardle is a 80 y. o. with a past history of CVA about 5 years ago, a fib, dementia who was admitted on 5/15/2020 from home (splits her time w/ her dtrs and son's homes) with anorexia and lethargy. UTI, started on IV abx. Pulmonary consulted for possible thoracentesis for R pleural effusion. Current medical issues leading to Palliative Medicine involvement include: care decisions. Social: Pt had CVA 5 years ago, before very active. Mult children, mPOA is Margarita. She splits her time between 3 of her children's homes and given pandemic living w/ [facility] the most right now. PALLIATIVE DIAGNOSES:  
1. Dementia, vascular, CVA 2. Confusion, intermittently awake/alert and talks 3. Debility 4. UTI 5. Goals of care 6. Right pleural effusion with right hemithorax showing white out 7. Shortness of breath PLAN:  
1. Chart reviewed from the weekend and the plan was for patient to be discharged home with hospice tomorrow. Was to have an evaluation today by pulmonary for possible palliative thoracentesis. Patient appears to have taken a fairly rapid decline as [place], hospice liaison, and I enter the room. Patient minimally responsive and almost appeared to have agonal-like breathing at times. Extremities appear very cool. Patient appears to be reaching end-of-life by my evaluation this morning. 2. Spoke with patient's daughter Shadi Deras since she lives locally. Explained the current situation and she becomes very tearful. Have concerns on the ability of her mom to return home at this point. She would like to come to the hospital to see her.   We have placed her on the visitation list. Patient would actually be appropriate for inpatient hospice if family agrees. Ideally, we would need for patient's son to sign hospice paperwork since he is the medical POA. Jacklyn Umaña is working on that situation. Arabella Chawla 3. Goals of carehospice/comfort suspect patient will be appropriate for inpatient hospice. Waiting for daughter to arrive to the bedside. 4. Symptom managementpatient does appear to need morphine for comfort/shortness of breath. After family arrives, we will discuss further symptom management. 5. Has AMD and DDNR on file. 6. Discussed with bedside nurse, Dr. Kade Mckeon, and [facility] liaison 7. Initial consult note routed to primary continuity provider and/or primary health care team members 8. Communicated plan of care with: Palliative IDTLeatha 192 Team incl bedside RN 
 
 GOALS OF CARE / TREATMENT PREFERENCES:  
 
GOALS OF CARE: 
Patient/Health Care Proxy Stated Goals: Other (comment)(To get as good as she can get to go home w/ hospice ) TREATMENT PREFERENCES:  
Code Status: DNR Advance Care Planning: 
[x] [facility] Interdisciplinary Team has updated the ACP Navigator with [place] and Patient Capacity Primary Decision Maker: Titi Mems - Child - [phone] No flowsheet data found. Medical Interventions: Limited additional interventions Other: As far as possible, the palliative care team has discussed with patient / health care proxy about goals of care / treatment preferences for patient. HISTORY:  
 
History obtained from: Pt, family ,chart, staff CHIEF COMPLAINT: Denies pain HPI/SUBJECTIVE: The patient is:  
[x] Verbal and participatory [] Non-participatory due to:  
 
Pt only saying a few words. On 1L NC O2, comfortable. 5/18patient minimally responsive. Breathing has slowed and almost agonal at times.  
 
Clinical Pain Assessment (nonverbal scale for severity on nonverbal patients):  
 Clinical Pain Assessment Severity: 0 Activity (Movement): Lying quietly, normal position Duration: for how long has pt been experiencing pain (e.g., 2 days, 1 month, years) Frequency: how often pain is an issue (e.g., several times per day, once every few days, constant) FUNCTIONAL ASSESSMENT:  
 
Palliative Performance Scale (PPS): PPS: 30 
 
 
 PSYCHOSOCIAL/SPIRITUAL SCREENING:  
 
Palliative IDT has assessed this patient for cultural preferences / practices and a referral made as appropriate to needs (Cultural Services, Patient Advocacy, Ethics, etc.) Any spiritual / Restorationism concerns: 
[] Yes /  [x] No 
 
Caregiver Burnout: 
[] Yes /  [x] No /  [] No Caregiver Present Anticipatory grief assessment:  
[x] Normal  / [] Maladaptive ESAS Anxiety: ESAS Depression:    
 
Cannot obtain due to patient factors REVIEW OF SYSTEMS:  
 
Positive and pertinent negative findings in ROS are noted above in HPI. The following systems were [x] reviewed / [] unable to be reviewed as noted in HPI Other findings are noted below. Systems: constitutional, ears/nose/mouth/throat, respiratory, gastrointestinal, genitourinary, musculoskeletal, integumentary, neurologic, psychiatric, endocrine. Positive findings noted below. Modified ESAS Completed by: provider Fatigue: 8 Drowsiness: 0 Pain: 0 Dyspnea: 0 Stool Occurrence(s): 1 PHYSICAL EXAM:  
 
From RN flowsheet: 
Wt Readings from Last 3 Encounters:  
05/17/20 100 lb 1.4 oz (45.4 kg) 03/15/16 126 lb (57.2 kg) 07/10/15 129 lb (58.5 kg) Blood pressure 146/69, pulse 86, temperature 96.5 °F (35.8 °C), resp. rate 18, height 5' 3\" (1.6 m), weight 100 lb 1.4 oz (45.4 kg), SpO2 93 %. Pain Scale 1: Visual 
Pain Intensity 1: 0 Last bowel movement, if known:  
 
Constitutional: Minimally responsive, ashen Eyes: pupils equal, anicteric ENMT: no nasal discharge, moist mucous membranes Respiratory: breathing mildly labored, decreased on the right, agonal breathing at times Musculoskeletal: no deformity, muscle wasting Skin: warm, dry Neurologic: not following commands consistently, L hemiparesis Psychiatric: no anxiety HISTORY:  
 
Principal Problem: 
  UTI (urinary tract infection) (5/15/2020) Active Problems: 
  Atrial fibrillation with RVR (HCC) () Hx of completed stroke () 
 
  FTT (failure to thrive) in adult (5/15/2020) Sepsis ([facility] [address] (5/15/2020) Pleural effusion on right (5/15/2020) Chronic anticoagulation (5/15/2020) Fecal impaction ([address] (5/15/2020) Past Medical History:  
Diagnosis Date  Aphasia  Atrial fibrillation ([facility] [address]  Hemiparesis affecting left side as late effect of cerebrovascular accident (CVA) ([facility] [address]  Hx of completed stroke No past surgical history on file. No family history on file. History reviewed, no pertinent family history. Social History Tobacco Use  Smoking status: Never Smoker Substance Use Topics  Alcohol use: No  
 
Allergies Allergen Reactions  Aggrenox [Aspirin-Dipyridamole] Nausea and Vomiting  Codeine Other (comments)  
  unknown  Pcn [Penicillins] Other (comments)  
  unknown Current Facility-Administered Medications Medication Dose Route Frequency  sodium phosphate 20 mmol in 0.9% sodium chloride 250 mL infusion   IntraVENous ONCE  
 magnesium sulfate 1 g/100 ml IVPB (premix or compounded)  1 g IntraVENous ONCE  
 vancomycin (VANCOCIN) 750 mg in 0.9% sodium chloride (MBP/ADV) 250 mL  750 mg IntraVENous Q12H  polyethylene glycol (MIRALAX) packet 17 g  17 g Oral QHS  metroNIDAZOLE (FLAGYL) IVPB premix 500 mg  500 mg IntraVENous Q12H  
 metoprolol tartrate (LOPRESSOR) tablet 12.5 mg  12.5 mg Oral DAILY  metoprolol tartrate (LOPRESSOR) tablet 25 mg  25 mg Oral QHS  dextrose 5% - 0.45% NaCl with KCl 20 mEq/L infusion  150 mL/hr IntraVENous CONTINUOUS  
  acetaminophen (TYLENOL) tablet 650 mg  650 mg Oral Q4H PRN  
 diphenhydrAMINE (BENADRYL) capsule 25 mg  25 mg Oral Q4H PRN  
 ondansetron (ZOFRAN) injection 4 mg  4 mg IntraVENous Q4H PRN  
 labetaloL (NORMODYNE;TRANDATE) 20 mg/4 mL (5 mg/mL) injection 20 mg  20 mg IntraVENous Q6H PRN  
 senna-docusate (PERICOLACE) 8.6-50 mg per tablet 1 Tab  1 Tab Oral DAILY  cefepime (MAXIPIME) 2 g in 0.9% sodium chloride (MBP/ADV) 100 mL  2 g IntraVENous Q12H  
 
 
 
 LAB AND IMAGING FINDINGS:  
 
Lab Results Component Value Date/Time WBC 16.5 (H) 05/18/2020 04:10 AM  
 HGB 14.4 05/18/2020 04:10 AM  
 PLATELET 299 24/91/4297 04:10 AM  
 
Lab Results Component Value Date/Time Sodium 138 05/18/2020 04:10 AM  
 Potassium 4.8 05/18/2020 04:10 AM  
 Chloride 109 (H) 05/18/2020 04:10 AM  
 CO2 25 05/18/2020 04:10 AM  
 BUN 15 05/18/2020 04:10 AM  
 Creatinine 0.42 (L) 05/18/2020 04:10 AM  
 Calcium 8.5 05/18/2020 04:10 AM  
 Magnesium 1.8 05/18/2020 04:10 AM  
 Phosphorus 1.5 (L) 05/18/2020 04:10 AM  
  
Lab Results Component Value Date/Time AST (SGOT) 20 05/16/2020 01:36 AM  
 Alk. phosphatase 58 05/16/2020 01:36 AM  
 Protein, total 5.3 (L) 05/16/2020 01:36 AM  
 Albumin 2.1 (L) 05/16/2020 01:36 AM  
 Globulin 3.2 05/16/2020 01:36 AM  
 
Lab Results Component Value Date/Time INR 1.2 (H) 05/18/2020 04:10 AM  
 Prothrombin time 12.3 (H) 05/18/2020 04:10 AM  
 aPTT 36.2 07/10/2015 11:42 AM  
  
Lab Results Component Value Date/Time Ferritin 306 (H) 05/15/2020 08:26 PM  
  
No results found for: PH, PCO2, PO2 No components found for: [place] Lab Results Component Value Date/Time CK 27 07/10/2015 11:42 AM  
  
 
 
   
 
Total time: 35  
Counseling / coordination time, spent as noted above: 30 min  
> 50% counseling / coordination?: yes Prolonged service was provided for  []30 min   []75 min in face to face time in the presence of the patient, spent as noted above. Time Start:  
Time End: Note: this can only be billed with 52072 (initial) or 73358 (follow up). If multiple start / stop times, list each separately.

## 2020-05-18 NOTE — PROGRESS NOTES
[facility] Dosing Services: Antimicrobial Stewardship Daily Doc Consult for antibiotic dosing of vancomycin by Dr. Mcknight Child Indication: Sepsis likely due to UTI Day of Therapy: 3 Ht Readings from Last 1 Encounters:  
05/17/20 160 cm (63\") Wt Readings from Last 1 Encounters:  
05/17/20 45.4 kg (100 lb 1.4 oz) Vancomycin therapy: 
Current maintenance dose: 750 mg IV every 24 hours Dose calculated to approximate a therapeutic trough of 15-20 mcg/mL. Assessment/Plan: 
SCr low, leukocytosis persists WBC = 12.9, procalcitonin on admission = 0.14, afebrile in past 24 hours, urine output not documented Blood cultures no growth x 2 days and no other source identified, reasonable to target trough goal of 10-15 mcg/mL Vancomycin trough level this evening is sub-therapeutic - will adjust regimen to 750 mg IV every 12 hours which predicts a trough of 11.5 mcg/mL based on individual kinetics Will also change 750 mg dose (16.5 mg/kg) to be infused over 60 minutes (not 90 minutes) per protocol BMP ordered tomorrow AM by hospitalist 
Dose administration notes:   Doses given appropriately as scheduled Date Dose & Interval Measured (mcg/mL) Extrapolated (mcg/mL) ? 5/17 at 2025 ?750 mg IV q24h ?3.1 ? 2.7  
? ? ? ?  
? ? ? ? Other Antimicrobial  
(not dosed by pharmacist) Cefepime 2 g IV every 12 hours Metronidazole 500 mg IV every 12 hours Cultures 5/15 - Resp Panel - None detected - FINAL 
5/15 - SARS-CoV-2: Not detected - FINAL 
5/15 - Blood, paired - NGTD x 2 days - Prelim 5/15 - Urine - Possible alpha strep - Prelim 5/16 - MRSA - Not Present at 17 hours - Prelim Serum Creatinine Lab Results Component Value Date/Time Creatinine 0.52 (L) 05/17/2020 04:17 AM  
  
  
Creatinine Clearance Estimated Creatinine Clearance: 34.5 mL/min (A) (by C-G formula based on SCr of 0.52 mg/dL (L)). Temp Temp: 96.6 °F (35.9 °C) WBC Lab Results Component Value Date/Time WBC 12.9 (H) 05/17/2020 04:17 AM  
 
  
H/H Lab Results Component Value Date/Time HGB 13.2 05/17/2020 04:17 AM  
 
  
Platelets Lab Results Component Value Date/Time PLATELET 710 27/56/6128 04:17 AM  
 
  
 
For Antifungals, Metronidazole, and Nafcillin:  ALT: 22  AST:  20  Alk Phos: 58 T Bili: 0.8 Pharmacist Benito Cranker, [place] Contact information:

## 2020-05-18 NOTE — PROGRESS NOTES
Brian Linkelsen oswald [address] 
[address] [facility], [address] [place] [address] 
[phone] Medical Progress Note NAME: Israel Pinedo :  3/20/1925 MRM:  [id number] Date/Time: 2020  8:25 AM  
 
  
Subjective: Chief Complaint:  Sepsis: patient is aphasic at baseline and cannot answer my questions ROS: 
(bold if positive, if negative) Unable to obtain due to aphasia Objective:  
 
 
Vitals:  
 
 
  
Last 24hrs VS reviewed since prior progress note. Most recent are: 
 
Visit Vitals /69 (BP 1 Location: Left arm, BP Patient Position: At rest) Pulse 86 Temp 96.5 °F (35.8 °C) Resp 18 Ht 5' 3\" (1.6 m) Wt 45.4 kg (100 lb 1.4 oz) SpO2 93% BMI 17.73 kg/m² SpO2 Readings from Last 6 Encounters:  
20 93% 03/15/16 99%  
07/10/15 95% O2 Flow Rate (L/min): 4 l/min Intake/Output Summary (Last 24 hours) at 2020 1149 Last data filed at 2020 0127 Gross per 24 hour Intake 100 ml Output 710 ml Net -610 ml Exam:  
 
Physical Exam: 
 
Gen:  Well-developed, thin, frail, elderly, chronically ill-appearing, in no acute distress HEENT:  Pink conjunctivae, PERRL, hearing intact to voice, moist mucous membranes Neck:  Supple, without masses, thyroid non-tender Resp:  No accessory muscle use, clear breath sounds without wheezes rales or rhonchi 
Card:  No murmurs, normal S1, S2 without thrills, bruits or peripheral edema, 2+ pedal pulses Abd:  Soft, non-tender, non-distended, normoactive bowel sounds are present, no palpable organomegaly and no detectable hernias Lymph:  No cervical or inguinal adenopathy Musc:  No cyanosis or clubbing Skin:  No rashes or ulcers, skin turgor is good, cap refill <2 sec Neuro:  Aphasic, left hemiparesis, does not follow commands appropriately Psych:  No insight, not oriented to person, place and time, lethargic Medications Reviewed: (see below) Lab Data Reviewed: (see below) ______________________________________________________________________ Medications:  
 
Current Facility-Administered Medications Medication Dose Route Frequency  sodium phosphate 20 mmol in 0.9% sodium chloride 250 mL infusion   IntraVENous ONCE  
 morphine injection 1 mg  1 mg IntraVENous Q2H PRN  
 vancomycin (VANCOCIN) 750 mg in 0.9% sodium chloride (MBP/ADV) 250 mL  750 mg IntraVENous Q12H  polyethylene glycol (MIRALAX) packet 17 g  17 g Oral QHS  metroNIDAZOLE (FLAGYL) IVPB premix 500 mg  500 mg IntraVENous Q12H  
 metoprolol tartrate (LOPRESSOR) tablet 12.5 mg  12.5 mg Oral DAILY  metoprolol tartrate (LOPRESSOR) tablet 25 mg  25 mg Oral QHS  dextrose 5% - 0.45% NaCl with KCl 20 mEq/L infusion  150 mL/hr IntraVENous CONTINUOUS  
 acetaminophen (TYLENOL) tablet 650 mg  650 mg Oral Q4H PRN  
 diphenhydrAMINE (BENADRYL) capsule 25 mg  25 mg Oral Q4H PRN  
 ondansetron (ZOFRAN) injection 4 mg  4 mg IntraVENous Q4H PRN  
 labetaloL (NORMODYNE;TRANDATE) 20 mg/4 mL (5 mg/mL) injection 20 mg  20 mg IntraVENous Q6H PRN  
 senna-docusate (PERICOLACE) 8.6-50 mg per tablet 1 Tab  1 Tab Oral DAILY  cefepime (MAXIPIME) 2 g in 0.9% sodium chloride (MBP/ADV) 100 mL  2 g IntraVENous Q12H Lab Review:  
 
Recent Labs 05/18/20 
0410 05/17/20 
8981 05/16/20 
0136 WBC 16.5* 12.9* 12.0*  
HGB 14.4 13.2 11.9 HCT 47.3* 41.2 36.9  173 154 Recent Labs 05/18/20 
0410 05/17/20 
3239 05/16/20 
0136 05/15/20 
2026  141 145 140  
K 4.8 4.8 4.0 4.1 * 112* 115* 109* CO2 25 26 24 27 * 144* 180* 142* BUN 15 18 26* 27* CREA 0.42* 0.52* 0.68 0.78  
CA 8.5 8.8 8.0* 9.4 MG 1.8 2.4 1.5* 1.9 PHOS 1.5* 1.7* 1.9* 1.7* ALB  --   --  2.1* 3.0* TBILI  --   --  0.8 1.2* SGOT  --   --  20 23 ALT  --   --  22 25 INR 1.2*  --   --   --   
 
Lab Results Component Value Date/Time  Glucose (POC) 94 07/10/2015 11:38 AM  
 
 No results for input(s): PH, PCO2, PO2, HCO3, FIO2 in the last 72 hours. Recent Labs 05/18/20 
0410 INR 1.2* No results found for: SDES Lab Results Component Value Date/Time Culture result: MRSA NOT PRESENT 05/16/2020 06:49 PM  
 Culture result:  05/16/2020 06:49 PM  
      Screening of patient nares for MRSA is for surveillance purposes and, if positive, to facilitate isolation considerations in high risk settings. It is not intended for automatic decolonization interventions per se as regimens are not sufficiently effective to warrant routine use. Culture result: POSSIBLE ALPHA STREPTOCOCCUS (A) 05/15/2020 08:40 PM  
 Culture result: (A) 05/15/2020 08:40 PM  
  STREPTOCOCCI, BETA HEMOLYTIC GROUP B Penicillin and ampicillin are drugs of choice for treatment of beta-hemolytic streptococcal infections. Susceptibility testing of penicillins and beta-lactams approved by the FDA for treatment of beta-hemolytic streptococcal infections need not be performed routinely, because nonsusceptible isolates are extremely rare. CLSI 2012 Assessment:  
 
Principal Problem: 
  UTI (urinary tract infection) (5/15/2020) Active Problems: 
  Atrial fibrillation with RVR (HCC) () Hx of completed stroke () 
 
  FTT (failure to thrive) in adult (5/15/2020) Sepsis ([address] (5/15/2020) Pleural effusion on right (5/15/2020) Chronic anticoagulation (5/15/2020) Fecal impaction ([facility] [address] (5/15/2020) Plan:  
 
  UTI (urinary tract infection) (5/15/2020) 
 - urine cx grew beta strep, will deescalate abx to rocephin only. Atrial fibrillation with RVR (HCC) ()/Hx of completed stroke ()/Chronic anticoagulation (5/15/2020) - rate controlled after hydration mainly, had increased rate yesterday with activity in bed  
 - cont metoprolol Dehydration (5/15/2020) - resolved on IV fluids FTT (failure to thrive) in adult (5/15/2020) - suspect this is due to end stage disease which is not correctable but there is also likely an acute component of decompensation related to UTI 
 - Palliative Care to see, will probably transition to hospice later today Sepsis ([facility] (5/15/2020) 
 - UTI is likely source, on dc all abx, start rocephin 
 - negative COVID-19 test, low suspicion, no need to retest 
 
  Pleural effusion on right (5/15/2020) - Pulmonary consulted, will hold on on thoracentesis d/t pt age, dementia, probably will not provide any meaningful benefit Fecal impaction ([facility] [address] (5/15/2020) 
 - enema produced results, continue bowel agents as ordered Care Plan discussed with: Patient, Care Manager and Nursing Staff Discussed:  Code Status, Care Plan and D/C Planning Prophylaxis:  not needed Disposition:  TBD, favor hospice of some sort at discharge 
        
___________________________________________________ Attending Physician: Eileen Grewal MD

## 2020-05-18 NOTE — PROGRESS NOTES
Problem: Falls - Risk of 
Goal: *Absence of Falls Description: Document Adrianna Baig Fall Risk and appropriate interventions in the flowsheet. Outcome: Progressing Towards Goal 
Note: Fall Risk Interventions: 
Mobility Interventions: Strengthening exercises (ROM-active/passive) Mentation Interventions: Door open when patient unattended Medication Interventions: Patient to call before getting OOB, Teach patient to arise slowly, Utilize gait belt for transfers/ambulation Elimination Interventions: Toileting schedule/hourly rounds Problem: Patient Education: Go to Patient Education Activity Goal: Patient/Family Education Outcome: Progressing Towards Goal 
  
Problem: Pressure Injury - Risk of 
Goal: *Prevention of pressure injury Description: Document Kan Scale and appropriate interventions in the flowsheet. Outcome: Progressing Towards Goal 
Note: Pressure Injury Interventions: 
Sensory Interventions: Check visual cues for pain Moisture Interventions: Absorbent underpads Activity Interventions: PT/OT evaluation Mobility Interventions: PT/OT evaluation Nutrition Interventions: Discuss nutritional consult with provider Friction and Shear Interventions: HOB 30 degrees or less Problem: Patient Education: Go to Patient Education Activity Goal: Patient/Family Education Outcome: Progressing Towards Goal

## 2020-05-18 NOTE — HOSPICE
[facility] Good Help to Those in Need 
[phone] Inpatient Nursing Admission Patient Name: Oj Britton YOB: 1925 Age: 80 y.o. Date of Hospice Admission: 5/18/2020 Hospice Attending Elected by Patient: Tyrone Lockhart MD 
Primary Care Physician: Philippe Bermudez MD 
Admitting RN: Sussy Damian RN : Andrew Lira LCSW Level of Care (GIP/Routine/Respite): GIP Facility of Care: [address] [place] [address] Patient Room: [facility] HOSPICE SUMMARY  
ER Visits/ Hospitalizations in past year: 
Hospice Diagnosis: End stage cardiac disease Onset Date of Hospice Diagnosis: 05/18/2020 Summary of Disease Progression Leading to Hospice Diagnosis: Per Palliative Tilda Armandocyndy Dequan Carrasco is a 80 y. o. with a past history of CVA about 5 years ago, a fib, dementia who was admitted on 5/15/2020 from home (splits her time w/ her dtrs and son's homes) with anorexia and lethargy. UTI, started on IV abx. Pulmonary consulted for possible thoracentesis for R pleural effusion. \" Patient planned to go home w/ hospice however she has had a significant decline. Breathing is labored, extremities are cold, minimal responsiveness Co-Morbidities:  
Patient Active Problem List  
Diagnosis Code  Aphasia R47.01  
 Atrial fibrillation with RVR ([facility]) I48.91  
 Hemiparesis affecting left side as late effect of cerebrovascular accident (CVA) ([facility] I69.354  
 Hx of completed stroke Z80.78  
 UTI (urinary tract infection) N39.0  Back pain M54.9  FTT (failure to thrive) in adult R62.7  Sepsis ([facility] A41.9  Pleural effusion on right J90  Chronic anticoagulation Z79.01  
 Fecal impaction (HCC) K56.41  
 End stage heart failure (HCC) I50.84 Diagnoses RELATED to the terminal prognosis: plueral effusion, dyspnea Other Diagnoses: see above Rationale for a prognosis of life expectancy of 6 months or less if the disease follows its normal course (Disease Specific History):  
 
 Sissy Baker is a 80 y. o. who was admitted to [address] [place] [address]. The patient's principle diagnosis of  Chronic heart failure w/ respiratory failure has resulted in pursuit of end of life care. Functionally, the patient's Palliative Performance Scale has declined over a period of days and is estimated at 10. Objective information that support this patients limited prognosis includes: minimal responsiveness, dyspnea requiring IV intervention, lethargy, anorexia, . The patient/family chose comfort measures with the support of Hospice. Patient meets for GIP LOC as evidenced by symtpoms requiring management I the hospital setting Prognosis estimated based on 05/18/20 clinical assessment is:  
[] Few to Many Hours [x] Hours to Days [x] Few to Many Days  
[] Days to Weeks  
[] Few to Many Weeks  
[] Weeks to Months  
[] Few to Many Months ASSESSMENT Patient self-reports:  []  Yes    [x] No 
 
SYMPTOMS: SOB, mild restlessness SIGNS/PHYSICAL FINDINGS: extremities cool, facial grimacing, groaning, tachypnea KARNOFSKY: 10 Learning Assessment: 
Patient is unable to learn Is patient willing/able to learn? What is the highest level of education completed? Learning preference (written material, demonstration, visual)? Learning barriers (ESOL, Cachil DeHe, poor vision)? Caregiver is willing to learn and receptive to information given Is caregiver willing to learn care for patient? What is the highest level of education completed? Learning preference (written material, demonstration, visual)? Learning barriers (ESOL, Cachil DeHe, poor vision)? CLINICAL INFORMATION Wt Readings from Last 3 Encounters:  
05/17/20 45.4 kg (100 lb 1.4 oz) 03/15/16 57.2 kg (126 lb)  
07/10/15 58.5 kg (129 lb) Ht Readings from Last 3 Encounters:  
05/17/20 5' 3\" (1.6 m)  
03/15/16 5' 6\" (1.676 m)  
07/10/15 5' 6\" (1.676 m) There is no height or weight on file to calculate BMI. There were no vitals taken for this visit. LAB VALUES No results found for this visit on 20 (from the past 12 hour(s)). No results found for this visit on 20 (from the past 6 hour(s)). Lab Results Component Value Date/Time Protein, total 5.3 (L) 2020 01:36 AM  
 Albumin 2.1 (L) 2020 01:36 AM  
 
 
Currently this patient has: 
[x] Supplemental O2 [] Peripheral IV  [] PICC    [] PORT [x] Orosco Catheter [] NG Tube   [] PEG Tube [] Ostomy   
[] AICD: Has ICD been deactivated? [] Yes [] No:______ PLAN 1. Admit to hospice under GIP level of care 2. Schedule morphine q4hr for work of breathing 3. Support family at bedside as able 4. PRN medications ordered for breakthrough symptoms 5. Turn q2hr for skin protection Hospice Team Frequency Orders: 
 
 
Skilled Nurse -   Daily x 7 days /every other day x 7 days  with 5 PRN visits for symptom control. MSW  1 visit for initial assessment/evaluation for family support and need for volunteer services. Lizzette Lev  1 visit for initial assessment/evaluation for spiritual support. ADVANCE CARE PLANNING (Complete in ACP Flow Sheet) Code Status: DNR Durable DNR: []  Yes  [x]  No 
Code Status Discussed/Confirmed:  yes Preference for Other Life Sustaining Treatment Discussed/Confirmed:yes Hospitalization Preference: [facility]  (ex. Patient would like to receive end of life care in the hospital, in a facility, at home etc.) No flowsheet data found.  Service: [] Yes  [x]  No      [] Unknown Appropriate for Pinning Ceremony:  [] Yes     [x] No 
Zoroastrianism: Alevism  Home: [address] [place] [address] [phone] DISCHARGE PLANNING 1. Discharge Plan: should patient stabilize, can transition home 2. Patient/Family teaching: EOL symptom management 3. Response to patient/family teaching: verbalize understanding SOCIAL/EMOTIONAL/SPIRITUAL NEEDS Spiritual Issues Identified: TBD 
 
 Psych/ Social/ Emotional Issues Identified: daughter and son at bedside, 3rd child lives in [address] [place] [address]. Family grieving appropriately Caregiver Support: 
[] Provided information on End of Life Care  
[x] Material Provided: Jesus Huynh From My Sight or [facility]  
 
CARE COORDINATION Dr. Carlos Campa contacted, discharge to hospice order received Dr. Kiara Benson contacted, agrees to serve as attending provider for hospice and provided verbal certification of terminal illness with life expectancy of 6 months or less. Orders for hospice admission, medications and plan of treatment received. Medication reconciliation completed. MEDS: See medication list below DME: Per hospital 
Supplies: Per hospital 
IDT communication to include MD, SN, SW, CH and support team 
 
ALLERGIES AND MEDICATIONS Allergies: Allergies Allergen Reactions  Aggrenox [Aspirin-Dipyridamole] Nausea and Vomiting  Codeine Other (comments)  
  unknown  Pcn [Penicillins] Other (comments)  
  unknown Current Facility-Administered Medications Medication Dose Route Frequency  ondansetron (ZOFRAN) injection 4 mg  4 mg IntraVENous Q4H PRN  
 LORazepam (ATIVAN) injection 1 mg  1 mg IntraVENous Q15MIN PRN  
 ketorolac (TORADOL) injection 30 mg  30 mg IntraVENous Q8H PRN  
 scopolamine (TRANSDERM-SCOP) 1 mg over 3 days 1 Patch  1 Patch TransDERmal Q72H  
 glycopyrrolate (ROBINUL) injection 0.2 mg  0.2 mg IntraVENous Q4H PRN  
 bisacodyL (DULCOLAX) suppository 10 mg  10 mg Rectal DAILY PRN  
 morphine injection 2 mg  2 mg IntraVENous Q15MIN PRN  
 morphine injection 2 mg  2 mg IntraVENous Q4H

## 2020-05-18 NOTE — PROGRESS NOTES
Problem: Falls - Risk of 
Goal: *Absence of Falls Description: Document Beverly Gomes Fall Risk and appropriate interventions in the flowsheet. Outcome: Progressing Towards Goal 
Note: Fall Risk Interventions: 
Mobility Interventions: Bed/chair exit alarm Mentation Interventions: Bed/chair exit alarm Medication Interventions: Patient to call before getting OOB, Teach patient to arise slowly, Utilize gait belt for transfers/ambulation Elimination Interventions: Call light in reach Problem: Patient Education: Go to Patient Education Activity Goal: Patient/Family Education Outcome: Progressing Towards Goal 
  
Problem: Pressure Injury - Risk of 
Goal: *Prevention of pressure injury Description: Document Kan Scale and appropriate interventions in the flowsheet. Outcome: Progressing Towards Goal 
Note: Pressure Injury Interventions: 
Sensory Interventions: Assess changes in LOC Moisture Interventions: Absorbent underpads Activity Interventions: PT/OT evaluation Mobility Interventions: HOB 30 degrees or less Nutrition Interventions: Document food/fluid/supplement intake Friction and Shear Interventions: Apply protective barrier, creams and emollients Problem: Patient Education: Go to Patient Education Activity Goal: Patient/Family Education Outcome: Progressing Towards Goal 
  
Problem: Risk for Spread of Infection Goal: Prevent transmission of infectious organism to others Description: Prevent the transmission of infectious organisms to other patients, staff members, and visitors. Outcome: Progressing Towards Goal 
  
Problem: Patient Education:  Go to Education Activity Goal: Patient/Family Education Outcome: Progressing Towards Goal 
  
Problem: General Infection Care Plan (Adult and Pediatric) Goal: Improvement in signs and symptoms of infection Outcome: Progressing Towards Goal 
Goal: *Optimize nutritional status Outcome: Progressing Towards Goal 
  
 Problem: Patient Education: Go to Patient Education Activity Goal: Patient/Family Education Outcome: Progressing Towards Goal

## 2020-05-19 NOTE — PROGRESS NOTES
Bedside and Verbal shift change report given to Carmen Kumar RN (oncoming nurse) by Linda Bonds RN and Yusef Carrasco (offgoing nurse). Report included the following information SBAR, Kardex, Intake/Output, MAR, Accordion and Recent Results.

## 2020-05-19 NOTE — HOSPICE
This morning I attempted to speak to Jarrett Flores, the patient's daughter. I left a voice mail message introducing myself and offering to provide emotional support to the family. I also thanked Heather Gannon on behalf of the team for the opportunity to help her mother.

## 2020-05-19 NOTE — PROGRESS NOTES
1515: Bedside shift change report given to Jameson Mccarthy RN (oncoming nurse) by Jammie Lopes RN (offgoing nurse). Report included the following information SBAR and Kardex.

## 2020-05-19 NOTE — PROGRESS NOTES
1930: Bedside and Verbal shift change report given to Troy Foster RN (oncoming nurse) by Richard Hart RN (offgoing nurse). Report included the following information SBAR, Kardex, MAR, and Accordion. 0000: Turning and bath offered, family would like to leave her like she is for now. 0730: Bedside and Verbal shift change report given to Richard Hart RN (oncoming nurse) by Troy Foster RN (offgoing nurse). Report included the following information SBAR, Kardex, MAR and Accordion. Problem: Hospice Orientation Goal: Demonstrate understanding of hospice philosophy, plan of care, and home hospice program 
Description: The patient/family/caregiver will demonstrate understanding of hospice philosophy, plan of care and the home hospice program as evidenced by participation in meeting the patient's psychosocial, spiritual, medical, and physical needs inclusive of medical supplies/equipment focusing on symptoms. Outcome: Progressing Towards Goal 
  
Problem: Potential for Skin Breakdown Goal: Demonstrate ability to care for skin, monitor areas of breakdown and demonstrate methods to prevent breakdown Description: Patient/family/caregiver will demonstrate ability to care for patient's skin, monitor for areas of breakdown, and demonstrate methods to prevent breakdown during hospice care.  
Outcome: Progressing Towards Goal

## 2020-05-19 NOTE — PROGRESS NOTES
Bedside and Verbal shift change report given to Marin Barajas RN (oncoming nurse) by Moo Zuniga RN (offgoing nurse). Report included the following information SBAR, Kardex, MAR, Accordion and Recent Results.

## 2020-05-19 NOTE — PROGRESS NOTES
[facility] Good Help to Those in Need 
[phone] Patient Name: Joan Ramírez YOB: 1925 Date of Provider Hospice Visit: 05/19/20 Level of Care:   [x] General Inpatient (GIP)    [] Routine   [] Respite Current Location of Care: 
[] [facility] [x] [facility] [] [facility] [] [facility] [] Hospice [facility] IF [facility], patient referred from: 
[] [facility] [] [facility] [] [facility] [] [facility] [] Home [] Other:  
 
Date of Original Hospice Admission: 5/18/20 Hospice Medical Director at time of admission: Chepe Voss Principle Hospice Diagnosis: End-stage cardiac disease Diagnoses RELATED to the terminal prognosis: Advanced dementia, sepsis, urinary tract infection, right hemithorax opacification, acute respiratory failure Other Diagnoses: Jahaira Jimenez Joan Ramírez is a 80y.o. year old who was admitted to [facility]. Patient is a 80-year-old female mated to the hospital at [facility] on 5/15/2020 with dyspnea. Apparently dyspnea associated days of anorexia lethargy and fevers. Did appear to meet sepsis criteria at the time of admission. CT scanning showed a large right pleural effusion and a urinary tract infection. Patient was admitted to the hospital for further work-up and management. Patient with an echo that shows severe pulmonary hypertension with right ventricular heart failure. Patient initially arranged to go home with hospice but then had a significant decline over the last 24 hours with chest x-ray in the right showing complete opacification of the right hemithorax. Given this rapid decline, patient was admitted to inpatient hospice for management of her shortness of breath and restlessness. The patient's principle diagnosis has resulted in respiratory failure Refer to LCD Functionally, the patient's Karnofsky and/or Palliative Performance Scale has declined over a period of days to weeks and is estimated at 10.  The patient is dependent on the following ADLs: All Objective information that support this patients limited prognosis includes:  
Chest x-ray with complete opacification of the right hemithorax The patient/family chose comfort measures with the support of Hospice. HOSPICE DIAGNOSES Active Symptoms: 1. Acute diastolic heart failure 2. Shortness of breath 3. Restlessness with agitation 4. Complete opacification of the right hemithorax 5. End-of-life care PLAN 1. Patient will continue GIP level care. Patient needing frequent nursing assessment as well as IV medication adjustments. Patient appears much more restless with some increased work of breathing despite scheduled morphine. Patient's 3 daughters at the bedside. We discussed additional medication to support patient's comfort. Sometimes difficult to know if what she is experiencing is more pain versus restlessness/agitation. We discussed adding Ativan 1 mg every 4 hours scheduled to see if this helps and then we will recheck on her this afternoon. After reviewing why we are making these changes, family agreed. 2. Continue morphine 2 mg IV every 4 hours scheduled and every15 minutes as needed 3. Start Ativan 1 mg IV every 4 hours scheduled and every 15 minutes as needed. Patient did require 2 PRN doses overnight. 4. We will reassess in the afternoon to see if further adjustments need to be made 5. Comfort order set placed for all other symptoms 6. Discussed with bedside nurse, Dominga Hilario liaison, 3 daughters at the bedside. 7.  and SW to support family needs 8. Disposition: Likely will pass in the hospital 
9. Hospice Plan of care was reviewed in detail and agree with current plan of care Prognosis estimated based on 05/19/20 clinical assessment is:  
[x] Hours to Days   
[] Days to Weeks   
[] Other: 
 
Communicated plan of care with: Hospice Case Manager;  Hospice IDT; Care Team 
 
 GOALS OF CARE  
 
 Patient/Medical POA stated Goal of Care: Comfort care with the support of hospice 
 
[x] I have reviewed and/or updated ACP information in the Advance Care Planning Navigator. This information is available in the [address] link in the patient's chart header. Primary Decision [address] ([address]):   Primary Decision Maker: Mandeep Vega - [phone] Resuscitation Status: DNR If DNR is there a Durable DNR on file? : [x] Yes [] No (If no, complete Durable DNR) HISTORY History obtained from: Chart, daughter CHIEF COMPLAINT: Confusion The patient is:  
[] Verbal 
[] Nonverbal 
[x] Unresponsive HPI/SUBJECTIVE: Patient appears minimally responsive but with agonal type breathing. Also shows some labored breathing with increase use of accessory muscle 5/19patient clearly with increased restlessness. She also is moaning at times. Showing increased work of breathing. Appears warm to the touch REVIEW OF SYSTEMS The following systems were: [] reviewed  [x] unable to be reviewed Positive ROS include: 
Constitutional: fatigue, weakness, in pain, short of breath Ears/nose/mouth/throat: increased airway secretions Respiratory:shortness of breath, wheezing Gastrointestinal:poor appetite, nausea, vomiting, abdominal pain, constipation, diarrhea Musculoskeletal:pain, deformities, swelling legs Neurologic:confusion, hallucinations, weakness Psychiatric:anxiety, feeling depressed, poor sleep Endocrine:  
 
Adult Non-Verbal Pain Assessment Score: 6 Face 
[] 0   No particular expression or smile 
[] 1   Occasional grimace, tearing, frowning, wrinkled forehead 
[x] 2   Frequent grimace, tearing, frowning, wrinkled forehead Activity (movement) [] 0   Lying quietly, normal position 
[] 1   Seeking attention through movement or slow, cautious movement 
[x] 2   Restless, excessive activity and/or withdrawal reflexes Guarding [] 0   Lying quietly, no positioning of hands over areas of body [x] 1   Splinting areas of the body, tense 
[] 2   Rigid, stiff Physiology (vital signs) [x] 0   Stable vital signs [] 1   Change in any of the following: SBP > 20mm Hg; HR > 20/minute 
[] 2   Change in any of the following: SBP > 30mm Hg; HR > 25/minute Respiratory 
[] 0   Baseline RR/SpO2, compliant with ventilator 
[x] 1   RR > 10 above baseline, or 5% drop SpO2, mild asynchrony with ventilator 
[] 2   RR > 20 above baseline, or 10% drop SpO2, asynchrony with ventilator FUNCTIONAL ASSESSMENT Palliative Performance Scale (PPS): 10 PSYCHOSOCIAL/SPIRITUAL ASSESSMENT Active Problems: 
  End stage heart failure ([facility] (5/18/2020) Past Medical History:  
Diagnosis Date  Aphasia  Atrial fibrillation ([facility]  Hemiparesis affecting left side as late effect of cerebrovascular accident (CVA) ([facility]  Hx of completed stroke No past surgical history on file. Social History Tobacco Use  Smoking status: Never Smoker Substance Use Topics  Alcohol use: No  
 
No family history on file. Allergies Allergen Reactions  Aggrenox [Aspirin-Dipyridamole] Nausea and Vomiting  Codeine Other (comments)  
  unknown  Pcn [Penicillins] Other (comments)  
  unknown Current Facility-Administered Medications Medication Dose Route Frequency  LORazepam (ATIVAN) injection 1 mg  1 mg IntraVENous Q4H  
 ondansetron (ZOFRAN) injection 4 mg  4 mg IntraVENous Q4H PRN  
 LORazepam (ATIVAN) injection 1 mg  1 mg IntraVENous Q15MIN PRN  
 ketorolac (TORADOL) injection 30 mg  30 mg IntraVENous Q8H PRN  
 scopolamine (TRANSDERM-SCOP) 1 mg over 3 days 1 Patch  1 Patch TransDERmal Q72H  
 glycopyrrolate (ROBINUL) injection 0.2 mg  0.2 mg IntraVENous Q4H PRN  
 bisacodyL (DULCOLAX) suppository 10 mg  10 mg Rectal DAILY PRN  
 morphine injection 2 mg  2 mg IntraVENous Q15MIN PRN  
  morphine injection 2 mg  2 mg IntraVENous Q4H PHYSICAL EXAM  
 
Wt Readings from Last 3 Encounters:  
05/17/20 100 lb 1.4 oz (45.4 kg) 03/15/16 126 lb (57.2 kg) 07/10/15 129 lb (58.5 kg) Visit Vitals /64 (BP 1 Location: Right arm) Pulse (!) 124 Temp 99.8 °F (37.7 °C) Resp 16 SpO2 93% Supplemental O2  [x] Yes  [] NO Last bowel movement:  
 
Currently this patient has: 
[x] Peripheral IV [] PICC  [] PORT [] ICD [x] Orosco Catheter [] NG Tube   [] PEG Tube   
[] Rectal Tube [] Drain 
[] Other:  
 
Constitutional: Ill-appearing, ashen, unresponsive, increased work of breathing Eyes: Reactive ENMT: Dry 
Cardiovascular: Tachycardia Respiratory: Patient appears to show increased respiratory rate, certainly decreased on the right side Gastrointestinal: Soft Musculoskeletal: Muscle wasting Skin: Unremarkable, warm to the touch Neurologic: Minimally responsive Psychiatric: Restlessness Other:  
 
 
Pertinent Lab and or Imaging Tests: 
Lab Results Component Value Date/Time Sodium 138 05/18/2020 04:10 AM  
 Potassium 4.8 05/18/2020 04:10 AM  
 Chloride 109 (H) 05/18/2020 04:10 AM  
 CO2 25 05/18/2020 04:10 AM  
 Anion gap 4 (L) 05/18/2020 04:10 AM  
 Glucose 200 (H) 05/18/2020 04:10 AM  
 BUN 15 05/18/2020 04:10 AM  
 Creatinine 0.42 (L) 05/18/2020 04:10 AM  
 BUN/Creatinine ratio 36 (H) 05/18/2020 04:10 AM  
 GFR est AA >60 05/18/2020 04:10 AM  
 GFR est non-AA >60 05/18/2020 04:10 AM  
 Calcium 8.5 05/18/2020 04:10 AM  
 
Lab Results Component Value Date/Time  Protein, total 5.3 (L) 05/16/2020 01:36 AM  
 Albumin 2.1 (L) 05/16/2020 01:36 AM  
 
   
 
Total time:  
Counseling / coordination time:  
> 50% counseling / coordination?:

## 2020-05-19 NOTE — HOSPICE
[address] [facility] Good Help to Those in Need 
[phone] [facility] Daily Nursing Note Patient Name: Gallito Marquis YOB: 1925 Age: 80 y.o. Date of Visit: 05/19/20 Facility of Care: [facility] Patient Room: 517/01 Hospice Attending: Emil Crews MD 
Hospice Diagnosis: End stage heart failure ([address] [I50.84] Level of Care: GIP Current GIP Symptoms 1. Pain 2. Agitation and restlessness 3. Labored breathing 4. Responsive to painful sitmuli 5. Skin warm and feverish ASSESSMENT & PLAN Must update Plan of Care including visit frequencies for IDT members 1. Continue GIP LOC 2. Continue scheduled morphine 3. Provide PRN Toradol for fevers 4. Add scheduled Ativan for ongoing restlessness 5. Provide support to family members at bedside Spiritual Interventions: hospice chaplain support Psych/ Social/ Emotional Interventions: bedside support from hospice SW, RN and MD 
 
Care Coordination Needs: communication ongoing w/ hospice LCSW Care plan and New Orders discussed / approved with Dr Marva Miranda MD. Description History and Chart Review If this is initial GIP note must document RN assessment/MD communication in previous setting. Specifically document nursing/medication needs in last 24 hours to support GIP care Narrative History of last 24 hours that demonstrates care cannot be provided in another setting: 
Patient admitted to hospice under GIP LOC, breathing labored, scheduled morphine ordered, PRN medications ordered What has been done to control the patient's symptoms in the last 24 hours? Scheduled ativan ordered q4 for restlessness and agitation, prn toradol given for fevers Does the patient currently require IV medications? yes Does the patient currently require scheduled medications? yes Does the patient currently require a PCA? no 
 
List number of doses of PRN medications in last 24 hours: 
Medication 1: Ativan Number of doses: 3 Medication 2: Toradol 1 Number of doses: 
 
Medication 3: Robinul 1 Number of doses: Supporting documentation for GIP need for pain control: 
[x] Frequent evaluation by a doctor, nurse practitioner, nurse  
[x] Frequent medication adjustment   
[x] IVs that cannot be administered at home  
[] Aggressive pain management  
[] Complicated technical delivery of medications Supporting documentation for GIP need for symptom control: 
[]  Sudden decline necessitating intensive nursing intervention 
[]  Uncontrolled / intractable nausea or vomiting  
[]  Pathological fractures 
[]  Advanced open wounds requiring frequent skilled care 
[] Unmanageable respiratory distress 
[] New or worsening delirium  
[] Delirium with behavior issues: Is 24 hour caregiver present due to safety concerns with agitation? (yes/no) [] Imminent death  with skilled nursing needs documented above DISCHARGE PLANNING Daily discharge planning required for GIP 1. Discharge Plan: should patient stabilize, can be transferred home or to LTC 2. Patient/Family teaching: EOL symptom management 3. Response to patient/family teaching: verbalize understanding ASSESSMENT   
KARNOFSKY: 10 Prognosis estimated based on 05/19/20 clinical assessment is:  
[] Few to Many Hours [x] Hours to Days  
[] Few to Many Days  
[] Days to Weeks  
[] Few to Many Weeks  
[] Weeks to Months  
[] Few to Many Months Quality Measure: Patient self-reports:  [] Yes    [x] No 
 
 
 
CLINICAL INFORMATION No data found. Currently this patient has: 
[x] Supplemental O2 [x] IV   
[] PICC [] PORT  
[] NG Tube   
[] PEG Tube  
[] Ostomy    
[x] Purewick draining __scant amounts of dark urine_____ urine 
[] Other:  
 
SIGNS/PHYSICAL FINDINGS Skin (including wound): 
[] Warm, dry, supple, intact and color normal for race 
[] Warm  
[] Dry  
[] Cool [x] Clammy      
[] Diaphoretic Turgor 
 [] Normal 
 [] Decreased Color: 
 [] Pink [x] Pale 
 [] Cyanotic 
 [] Erythema 
 [] Jaundice [] Normal for Race 
[]  Wounds: 
 
Neuro: 
[] Lethargy 
[] Restlessness / agitation 
[] Confusion / delirium 
[] Hallucinations 
[] Responds to maximal stimulation 
[x] Unresponsive 
[] Seizures Cardiac: 
[] Dyspnea on Exertion 
[] JVD [] Murmur 
[] Palpitations [] Hypotension 
[] Hypertension 
[x] Tachycardia [] Bradycardia [x] Irregular HR 
[] Pulses Decreased 
[] Pulses Absent 
[] Edema:       (Location, Grade and Pitting) [] Mottling:      (Location) Respiratory: 
Breath sounds:  
 [] Diminished 
 [] Wheeze [x] Rhonchi 
 [] Rales  
[] Even and unlabored 
[x] Labored:           
[] Cough 
 [] Non Productive 
 [] Productive 
  [] Description:          
[] Deep suctioned  
[x] O2 at 4_ LPM 
[] High flow oxygen greater than 10 LPM 
[] Bi-Pap GI 
[] Abdomen (describe) [] Ascites 
[] Nausea 
[] Vomiting 
[] Incontinent of bowels 
[] Bowel sounds (yes/no) [] Diarrhea 
[] Constipation (see above including last bowel movement) [] Checked for impaction 
[] Last BM Nutrition Diet:_npo_________ Appetite:  
[] Good  
[] Fair  
[] Poor  
[] Tube Feeding  
[] Voiding 
[x] Incontinent [x] [place]/Purewick Musculoskeletal 
[] Balance/[place] Unsteady  
[] Weak Strength:  
 [] Normal  
 [] Limited  
 [] Decreasing Activities:  
 [] Up as tolerated 
 [x] Bedridden  
 [] Specify: 
 
SAFETY [] 24 hr. Caregiver [x] Side rails ? [x] Hospital bed  
[x] Reviewed Falls & Safety ALLERGIES AND MEDICATIONS Allergies: Allergies Allergen Reactions  Aggrenox [Aspirin-Dipyridamole] Nausea and Vomiting  Codeine Other (comments)  
  unknown  Pcn [Penicillins] Other (comments)  
  unknown Current Facility-Administered Medications Medication Dose Route Frequency  LORazepam (ATIVAN) injection 1 mg  1 mg IntraVENous Q4H  
 ondansetron (ZOFRAN) injection 4 mg  4 mg IntraVENous Q4H PRN  
  LORazepam (ATIVAN) injection 1 mg  1 mg IntraVENous Q15MIN PRN  
 ketorolac (TORADOL) injection 30 mg  30 mg IntraVENous Q8H PRN  
 scopolamine (TRANSDERM-SCOP) 1 mg over 3 days 1 Patch  1 Patch TransDERmal Q72H  
 glycopyrrolate (ROBINUL) injection 0.2 mg  0.2 mg IntraVENous Q4H PRN  
 bisacodyL (DULCOLAX) suppository 10 mg  10 mg Rectal DAILY PRN  
 morphine injection 2 mg  2 mg IntraVENous Q15MIN PRN  
 morphine injection 2 mg  2 mg IntraVENous Q4H Visit Time In: 0010 Visit Time Out: 1100

## 2020-05-20 NOTE — PROGRESS NOTES
[facility] Provider Death Note Called to examine patient who has . No response to verbal and tactile stimuli. No respiratory effort. Absent heart sounds and pulses. Pupils fixed and dilated.   
Patient pronounced dead at 56 PM

## 2020-05-20 NOTE — PROGRESS NOTES
RN notified by family of patient's passing. Sandi Meza MD notified. [facility] notified. [facility] Dez Vidal notified. . [employer] called. [employer] and [employer] approve release.

## 2020-05-20 NOTE — HOSPICE
[facility] Good Help to Those in Need 
[phone] Discharge/Death Nursing Note Patient Name: Osman Goetz YOB: 1925 Age: 80 y.o. Date of Death: 20 Admitted Date: 2020 Time of [id number] Facility of Care: [facility] Level of Care: GIP Patient Room: [facility] Attending: Jessica Valdovinos MD 
Hospice Diagnosis: End stage heart failure ([facility] [address] [I50.84] Death Pronouncement Pronouncement of death completed by: Dr Jorden Spatz Agency staff  was present at the time of death At the time of death the patient was documented as No response to verbal and tactile stimuli. No respiratory effort. Absent heart sounds and pulses. Pupils fixed and dilated. No response to verbal and tactile stimuli. No respiratory effort. Absent heart sounds and pulses. Pupils fixed and dilated. The pt  within [facility] The following were notified of the patient's death: son at  Bedside, hospice MD, hospice intake Medications were disposed of per facility protocol Discharge Summary Discharge Reason: Death Summary of Care Provided: 
 
[x] Post mortem care provided by bedside RN [x] Notification of  home by nursing supervisor 
[] Referrals/Community resources provided:  
[] Goals completed 
[] Durable Medical Equipment vendor notified Disciplines involved: [x] RN [x] SW []  [] HA [] Vol [] PT [] OT [] ST [] BC 
 
[x] IDT communication/notification Attending Physician, Dr. Jorden Spatz, notified of death Bereaved Verify bereaved identified with name, address, telephone number and risk level No flowsheet data found.

## 2020-05-20 NOTE — DISCHARGE SUMMARY
Hospice Discharge Summary [facility] Good Help to Those in Need Date of Admission: 5/18/2020 Date of Discharge: 5/20/20 Colton Kwon is a 80y.o. year old who was admitted to [facility] at [facility] with a Hospice diagnosis of End stage heart failure ([facility] [address] [I50.84]. The patient's care was focused on comfort and the patient passed away on 5/20/20

## 2020-05-20 NOTE — PROGRESS NOTES
Pt resting in bed with son and daughter at bedside. Pt breathing is irregular, nonlabored, pt is only responsive to painful stimulus. Family refuses turn team. Bed in lowest setting, alarm on wctm

## 2020-05-20 NOTE — PROGRESS NOTES
responded to death of pt, Mrs Phylicia Lewis at the Med Surg unit. Two of pt's children, a son and a daughter greeted  at her door. They indicated that they were at peace with death of their mother. Pt's son stated that his mom was 95 years, and had a good life. He also stated they had ample time to say good bye.  provided supportive presence and affirmed expressed feelings of  pt's children. They thanked  for coming and indicated that nor further visit was needed at this time. Visited by: Ruchi Campos. To darien rajput: [phone])

## 2020-05-21 NOTE — HOSPICE
LCSW placed bereavement call to son, Milburn Phalen. No answer, left VM JOVI [facility] Social Work[facility] 
[phone]

## 2020-05-25 LAB
COMMENT, HOLDF: NORMAL
SAMPLES BEING HELD,HOLD: NORMAL